# Patient Record
Sex: MALE | Race: BLACK OR AFRICAN AMERICAN | NOT HISPANIC OR LATINO | URBAN - METROPOLITAN AREA
[De-identification: names, ages, dates, MRNs, and addresses within clinical notes are randomized per-mention and may not be internally consistent; named-entity substitution may affect disease eponyms.]

---

## 2019-08-13 ENCOUNTER — EMERGENCY (EMERGENCY)
Facility: HOSPITAL | Age: 69
LOS: 0 days | Discharge: HOME | End: 2019-08-14
Attending: EMERGENCY MEDICINE
Payer: COMMERCIAL

## 2019-08-13 VITALS
HEIGHT: 70 IN | TEMPERATURE: 98 F | OXYGEN SATURATION: 95 % | WEIGHT: 169.98 LBS | HEART RATE: 69 BPM | SYSTOLIC BLOOD PRESSURE: 151 MMHG | DIASTOLIC BLOOD PRESSURE: 96 MMHG | RESPIRATION RATE: 20 BRPM

## 2019-08-13 DIAGNOSIS — N39.0 URINARY TRACT INFECTION, SITE NOT SPECIFIED: ICD-10-CM

## 2019-08-13 DIAGNOSIS — R30.0 DYSURIA: ICD-10-CM

## 2019-08-13 DIAGNOSIS — R31.9 HEMATURIA, UNSPECIFIED: ICD-10-CM

## 2019-08-13 DIAGNOSIS — Z87.891 PERSONAL HISTORY OF NICOTINE DEPENDENCE: ICD-10-CM

## 2019-08-13 LAB
ALBUMIN SERPL ELPH-MCNC: 3.7 G/DL — SIGNIFICANT CHANGE UP (ref 3.3–5)
ALP SERPL-CCNC: 81 U/L — SIGNIFICANT CHANGE UP (ref 40–120)
ALT FLD-CCNC: 41 U/L — SIGNIFICANT CHANGE UP (ref 12–78)
ANION GAP SERPL CALC-SCNC: 5 MMOL/L — SIGNIFICANT CHANGE UP (ref 5–17)
APPEARANCE UR: CLEAR — SIGNIFICANT CHANGE UP
APTT BLD: 31.3 SEC — SIGNIFICANT CHANGE UP (ref 27.5–36.3)
AST SERPL-CCNC: 30 U/L — SIGNIFICANT CHANGE UP (ref 15–37)
BACTERIA # UR AUTO: ABNORMAL
BASOPHILS # BLD AUTO: 0.02 K/UL — SIGNIFICANT CHANGE UP (ref 0–0.2)
BASOPHILS NFR BLD AUTO: 0.3 % — SIGNIFICANT CHANGE UP (ref 0–2)
BILIRUB SERPL-MCNC: 0.5 MG/DL — SIGNIFICANT CHANGE UP (ref 0.2–1.2)
BILIRUB UR-MCNC: NEGATIVE — SIGNIFICANT CHANGE UP
BUN SERPL-MCNC: 12 MG/DL — SIGNIFICANT CHANGE UP (ref 7–23)
CALCIUM SERPL-MCNC: 8.5 MG/DL — SIGNIFICANT CHANGE UP (ref 8.5–10.1)
CHLORIDE SERPL-SCNC: 107 MMOL/L — SIGNIFICANT CHANGE UP (ref 96–108)
CO2 SERPL-SCNC: 25 MMOL/L — SIGNIFICANT CHANGE UP (ref 22–31)
COLOR SPEC: YELLOW — SIGNIFICANT CHANGE UP
CREAT SERPL-MCNC: 0.95 MG/DL — SIGNIFICANT CHANGE UP (ref 0.5–1.3)
DIFF PNL FLD: ABNORMAL
EOSINOPHIL # BLD AUTO: 0.17 K/UL — SIGNIFICANT CHANGE UP (ref 0–0.5)
EOSINOPHIL NFR BLD AUTO: 2.9 % — SIGNIFICANT CHANGE UP (ref 0–6)
GLUCOSE SERPL-MCNC: 123 MG/DL — HIGH (ref 70–99)
GLUCOSE UR QL: NEGATIVE MG/DL — SIGNIFICANT CHANGE UP
HCT VFR BLD CALC: 44.4 % — SIGNIFICANT CHANGE UP (ref 39–50)
HGB BLD-MCNC: 14.7 G/DL — SIGNIFICANT CHANGE UP (ref 13–17)
IMM GRANULOCYTES NFR BLD AUTO: 0.2 % — SIGNIFICANT CHANGE UP (ref 0–1.5)
INR BLD: 1.07 RATIO — SIGNIFICANT CHANGE UP (ref 0.88–1.16)
KETONES UR-MCNC: NEGATIVE — SIGNIFICANT CHANGE UP
LEUKOCYTE ESTERASE UR-ACNC: ABNORMAL
LYMPHOCYTES # BLD AUTO: 2.25 K/UL — SIGNIFICANT CHANGE UP (ref 1–3.3)
LYMPHOCYTES # BLD AUTO: 38 % — SIGNIFICANT CHANGE UP (ref 13–44)
MCHC RBC-ENTMCNC: 27.5 PG — SIGNIFICANT CHANGE UP (ref 27–34)
MCHC RBC-ENTMCNC: 33.1 GM/DL — SIGNIFICANT CHANGE UP (ref 32–36)
MCV RBC AUTO: 83 FL — SIGNIFICANT CHANGE UP (ref 80–100)
MONOCYTES # BLD AUTO: 0.42 K/UL — SIGNIFICANT CHANGE UP (ref 0–0.9)
MONOCYTES NFR BLD AUTO: 7.1 % — SIGNIFICANT CHANGE UP (ref 2–14)
NEUTROPHILS # BLD AUTO: 3.05 K/UL — SIGNIFICANT CHANGE UP (ref 1.8–7.4)
NEUTROPHILS NFR BLD AUTO: 51.5 % — SIGNIFICANT CHANGE UP (ref 43–77)
NITRITE UR-MCNC: NEGATIVE — SIGNIFICANT CHANGE UP
NRBC # BLD: 0 /100 WBCS — SIGNIFICANT CHANGE UP (ref 0–0)
PH UR: 6 — SIGNIFICANT CHANGE UP (ref 5–8)
PLATELET # BLD AUTO: 185 K/UL — SIGNIFICANT CHANGE UP (ref 150–400)
POTASSIUM SERPL-MCNC: 4.6 MMOL/L — SIGNIFICANT CHANGE UP (ref 3.5–5.3)
POTASSIUM SERPL-SCNC: 4.6 MMOL/L — SIGNIFICANT CHANGE UP (ref 3.5–5.3)
PROT SERPL-MCNC: 7.8 GM/DL — SIGNIFICANT CHANGE UP (ref 6–8.3)
PROT UR-MCNC: 100 MG/DL
PROTHROM AB SERPL-ACNC: 12 SEC — SIGNIFICANT CHANGE UP (ref 10–12.9)
RBC # BLD: 5.35 M/UL — SIGNIFICANT CHANGE UP (ref 4.2–5.8)
RBC # FLD: 12.4 % — SIGNIFICANT CHANGE UP (ref 10.3–14.5)
RBC CASTS # UR COMP ASSIST: >50 /HPF (ref 0–4)
SODIUM SERPL-SCNC: 137 MMOL/L — SIGNIFICANT CHANGE UP (ref 135–145)
SP GR SPEC: 1.02 — SIGNIFICANT CHANGE UP (ref 1.01–1.02)
UROBILINOGEN FLD QL: 1 MG/DL
WBC # BLD: 5.92 K/UL — SIGNIFICANT CHANGE UP (ref 3.8–10.5)
WBC # FLD AUTO: 5.92 K/UL — SIGNIFICANT CHANGE UP (ref 3.8–10.5)
WBC UR QL: ABNORMAL

## 2019-08-13 PROCEDURE — 99284 EMERGENCY DEPT VISIT MOD MDM: CPT

## 2019-08-13 NOTE — ED PROVIDER NOTE - OBJECTIVE STATEMENT
Pertinent PMH/PSH/FHx/SHx and Review of Systems contained within:    67yo M w no significant PMH/PSH presents to ED for eval of hematuria w dysuria.  Pt states sx started yesterday, +passage of small clots.  Denies fever, chills, trauma, abd pain, flank pain, urinary retention, bleeding from other sites, vomiting, previous episodes, use of ASA or other blood thinners.  Pt is former smoker, reports family hx DM.    No fever/chills, No photophobia/eye pain/changes in vision, No ear pain/sore throat/dysphagia, No chest pain/palpitations, no SOB/cough/wheeze/stridor, No abdominal pain, +dysuria/frequency, No neck/back pain, no rash, no changes in neurological status/function.

## 2019-08-13 NOTE — ED ADULT NURSE NOTE - OBJECTIVE STATEMENT
Pt presents w/ complaints of hematuria onset yesterday, also complains of dysuria, urinary frequency. Denied any flank pain, fever, n/v dizziness

## 2019-08-13 NOTE — ED PROVIDER NOTE - PHYSICAL EXAMINATION
Gen: Alert, NAD  Head: NC, AT, EOMI, normal lids/conjunctiva  ENT: normal hearing, patent oropharynx, MMM  Neck: supple, no tenderness/meningismus, FROM, Trachea midline  Pulm: Bilateral clear BS, normal resp effort, no wheeze/stridor/retractions  CV: RRR, no M/R/G, +dist pulses  Abd: soft, NT/ND, +BS, no guarding/rebound tenderness, no CVAT  Mskel: no edema/erythema/cyanosis  Skin: no rash  Neuro: no sensory/motor deficits

## 2019-08-13 NOTE — ED PROVIDER NOTE - CLINICAL SUMMARY MEDICAL DECISION MAKING FREE TEXT BOX
Pt w hematuria, UA +UTI.  CT w clot vs bladder wall thickening, also noted enlarged prostate.  Pt given urology follow up and states he will follow up.  Also discussed findings of emphysema & elevated glucose.  Discussed results and outcome of testing with the patient, given copy as well.  Pt given Rx and instructed/cautioned on use. Patient advised to please follow up with their primary care doctor within the next 24 hours and return to the Emergency Department for worsening symptoms or any other concerns.  Patient advised that their doctor may call  to follow up on the specific results of the tests performed today in the emergency department.

## 2019-08-13 NOTE — ED ADULT NURSE NOTE - NSIMPLEMENTINTERV_GEN_ALL_ED
Implemented All Universal Safety Interventions:  [place] to call system. Call bell, personal items and telephone within reach. Instruct patient to call for assistance. Room bathroom lighting operational. Non-slip footwear when patient is off stretcher. Physically safe environment: no spills, clutter or unnecessary equipment. Stretcher in lowest position, wheels locked, appropriate side rails in place.

## 2019-08-14 VITALS
RESPIRATION RATE: 14 BRPM | HEART RATE: 66 BPM | DIASTOLIC BLOOD PRESSURE: 77 MMHG | OXYGEN SATURATION: 97 % | TEMPERATURE: 98 F | SYSTOLIC BLOOD PRESSURE: 179 MMHG

## 2019-08-14 PROCEDURE — 74177 CT ABD & PELVIS W/CONTRAST: CPT | Mod: 26

## 2019-08-14 RX ORDER — CEFPODOXIME PROXETIL 100 MG
1 TABLET ORAL
Qty: 20 | Refills: 0
Start: 2019-08-14 | End: 2019-08-23

## 2019-08-14 RX ORDER — CEFTRIAXONE 500 MG/1
1000 INJECTION, POWDER, FOR SOLUTION INTRAMUSCULAR; INTRAVENOUS ONCE
Refills: 0 | Status: COMPLETED | OUTPATIENT
Start: 2019-08-14 | End: 2019-08-14

## 2019-08-14 RX ADMIN — CEFTRIAXONE 100 MILLIGRAM(S): 500 INJECTION, POWDER, FOR SOLUTION INTRAMUSCULAR; INTRAVENOUS at 00:46

## 2019-08-15 PROBLEM — Z00.00 ENCOUNTER FOR PREVENTIVE HEALTH EXAMINATION: Status: ACTIVE | Noted: 2019-08-15

## 2019-08-15 LAB
CULTURE RESULTS: SIGNIFICANT CHANGE UP
SPECIMEN SOURCE: SIGNIFICANT CHANGE UP

## 2019-08-16 ENCOUNTER — TRANSCRIPTION ENCOUNTER (OUTPATIENT)
Age: 69
End: 2019-08-16

## 2019-08-16 ENCOUNTER — APPOINTMENT (OUTPATIENT)
Dept: UROLOGY | Facility: CLINIC | Age: 69
End: 2019-08-16
Payer: COMMERCIAL

## 2019-08-16 VITALS
OXYGEN SATURATION: 98 % | DIASTOLIC BLOOD PRESSURE: 81 MMHG | HEIGHT: 70.5 IN | SYSTOLIC BLOOD PRESSURE: 131 MMHG | HEART RATE: 71 BPM

## 2019-08-16 DIAGNOSIS — Z80.42 FAMILY HISTORY OF MALIGNANT NEOPLASM OF PROSTATE: ICD-10-CM

## 2019-08-16 DIAGNOSIS — Z83.3 FAMILY HISTORY OF DIABETES MELLITUS: ICD-10-CM

## 2019-08-16 DIAGNOSIS — Z87.891 PERSONAL HISTORY OF NICOTINE DEPENDENCE: ICD-10-CM

## 2019-08-16 DIAGNOSIS — Z78.9 OTHER SPECIFIED HEALTH STATUS: ICD-10-CM

## 2019-08-16 PROCEDURE — 51798 US URINE CAPACITY MEASURE: CPT

## 2019-08-16 PROCEDURE — 99204 OFFICE O/P NEW MOD 45 MIN: CPT | Mod: 25

## 2019-08-16 NOTE — LETTER BODY
[Dear  ___] : Dear  [unfilled], [Consult Letter:] : I had the pleasure of evaluating your patient, [unfilled]. [Please see my note below.] : Please see my note below. [Consult Closing:] : Thank you very much for allowing me to participate in the care of this patient.  If you have any questions, please do not hesitate to contact me. [FreeTextEntry3] : Sincerely,\par \par Bita Barrera MD\par [facility]\par

## 2019-08-16 NOTE — REVIEW OF SYSTEMS
[Dry Eyes] : dryness of the eyes [Eyesight Problems] : eyesight problems [Poor quality erections] : Poor quality erections [Urine Infection (bladder/kidney)] : bladder/kidney infection [Pain during urination] : pain during urination [Blood in urine that you can see] : blood visible in urine [Told you have blood in urine on a urine test] : told blood was present in a urine test [Wake up at night to urinate  How many times?  ___] : wakes up to urinate [unfilled] times during the night [Strong urge to urinate] : strong urge to urinate [Strain or push to urinate] : strain or push to urinate [Bladder pressure] : experiences bladder pressure [Negative] : Heme/Lymph

## 2019-08-16 NOTE — HISTORY OF PRESENT ILLNESS
[FreeTextEntry1] : SHANE SCHMITT is a 68 year old M who presents with A 3 day history of gross hematuria and dysuria. He is complaining of increased urinary urgency and frequency and feels that he must be close to a restroom. He typically does not have urinary urgency and does in fact report passage of large blood clots with this episode. Apparently, he had similar symptoms over 15 years ago. He does not recall if he had an infection or a stone, but does not recall having known stones. He is no longer a smoker having quit 6 years ago, but did smoke a pack to a pack and a half a day for approximately 35 years. It is not believe he has had a baseline chest CT for screening purposes. With his dysuria there was no cloudiness to the urine prior or leading up to this episode and he denies any urethral discharge the urine is malodorous.\par \par In reference to review of systems, he has no constitutional symptoms, specifically denying weight loss, bone, flank, abdominal, or pelvic pain, or change in appetite.  At baseline, he voids 4 times per day with no hesitancy of initiation or urinary urgency. He feels his stream is pretty strong and is steady.  He denies double voiding or straining to empty. Since this episode, he is voiding 10-15 times per day and his nocturia has increased from one episode up to between 3 and 6 episodes of nocturia.\par \par Despite bleeding and some pyuria, urine culture was negative on 8/15/19. CT with IV contrast showed a very large central prostatic calcification and some prostatic enlargement with no protrusion of the middle lobe. He did have some small left renal calculi with no hydronephrosis, which were difficult to see due to the IV contrast. There was a soft tissue mass in the bladder which could have been due to clots.  He and I reviewed the CT scan together.\par

## 2019-08-16 NOTE — ASSESSMENT
[FreeTextEntry1] : Today our patient emptiesd his bladder completely, and his exam revealed no evidence of prostatitis or nodule. He believes he had a PSA checked with PCP, and I requested that he have them forward it to our attention. This is not the time to do it.  We will send urine for culture and cytology and he understands need for cystocopy.  Typically, a CT urogram is done (Without, with and with delayed imaging) to assess collecting system, but we will await our cystocopy and cytology, as he is going out of town for a few days next week anyway per patient. \par \par There is longevity in  with M passing at 83 and D at 85.  We will need to establish his baseline at some point in future.\par \par

## 2019-08-16 NOTE — PHYSICAL EXAM
[General Appearance - Well Developed] : well developed [General Appearance - Well Nourished] : well nourished [Normal Appearance] : normal appearance [Well Groomed] : well groomed [General Appearance - In No Acute Distress] : no acute distress [Edema] : no peripheral edema [Respiration, Rhythm And Depth] : normal respiratory rhythm and effort [Exaggerated Use Of Accessory Muscles For Inspiration] : no accessory muscle use [Abdomen Soft] : soft [Abdomen Tenderness] : non-tender [Abdomen Hernia] : no hernia was discovered [Urethral Meatus] : meatus normal [Costovertebral Angle Tenderness] : no ~M costovertebral angle tenderness [Penis Abnormality] : normal uncircumcised penis [Urinary Bladder Findings] : the bladder was normal on palpation [Scrotum] : the scrotum was normal [Epididymis] : the epididymides were normal [Testes Tenderness] : no tenderness of the testes [Testes Mass (___cm)] : there were no testicular masses [Prostate Tenderness] : the prostate was not tender [No Prostate Nodules] : no prostate nodules [Normal Station and Gait] : the gait and station were normal for the patient's age [] : no rash [No Focal Deficits] : no focal deficits [Oriented To Time, Place, And Person] : oriented to person, place, and time [Affect] : the affect was normal [Mood] : the mood was normal [Not Anxious] : not anxious [Femoral Lymph Nodes Enlarged Bilaterally] : femoral [Inguinal Lymph Nodes Enlarged Bilaterally] : inguinal

## 2019-08-19 LAB — BACTERIA UR CULT: NORMAL

## 2019-08-20 ENCOUNTER — CLINICAL ADVICE (OUTPATIENT)
Age: 69
End: 2019-08-20

## 2019-08-20 ENCOUNTER — TRANSCRIPTION ENCOUNTER (OUTPATIENT)
Age: 69
End: 2019-08-20

## 2019-08-20 ENCOUNTER — INPATIENT (INPATIENT)
Facility: HOSPITAL | Age: 69
LOS: 1 days | Discharge: HOME | End: 2019-08-22
Attending: INTERNAL MEDICINE | Admitting: INTERNAL MEDICINE
Payer: COMMERCIAL

## 2019-08-20 VITALS
RESPIRATION RATE: 15 BRPM | DIASTOLIC BLOOD PRESSURE: 93 MMHG | HEIGHT: 70 IN | WEIGHT: 179.02 LBS | SYSTOLIC BLOOD PRESSURE: 142 MMHG | OXYGEN SATURATION: 98 % | HEART RATE: 68 BPM | TEMPERATURE: 98 F

## 2019-08-20 DIAGNOSIS — N21.1 CALCULUS IN URETHRA: ICD-10-CM

## 2019-08-20 DIAGNOSIS — Z98.890 OTHER SPECIFIED POSTPROCEDURAL STATES: Chronic | ICD-10-CM

## 2019-08-20 DIAGNOSIS — R31.0 GROSS HEMATURIA: ICD-10-CM

## 2019-08-20 DIAGNOSIS — Z29.9 ENCOUNTER FOR PROPHYLACTIC MEASURES, UNSPECIFIED: ICD-10-CM

## 2019-08-20 LAB
ALBUMIN SERPL ELPH-MCNC: 3.6 G/DL — SIGNIFICANT CHANGE UP (ref 3.3–5)
ALP SERPL-CCNC: 86 U/L — SIGNIFICANT CHANGE UP (ref 40–120)
ALT FLD-CCNC: 23 U/L — SIGNIFICANT CHANGE UP (ref 12–78)
ANION GAP SERPL CALC-SCNC: 7 MMOL/L — SIGNIFICANT CHANGE UP (ref 5–17)
APPEARANCE UR: ABNORMAL
APTT BLD: 27.2 SEC — LOW (ref 28.5–37)
AST SERPL-CCNC: 8 U/L — LOW (ref 15–37)
BACTERIA # UR AUTO: ABNORMAL
BASOPHILS # BLD AUTO: 0.01 K/UL — SIGNIFICANT CHANGE UP (ref 0–0.2)
BASOPHILS NFR BLD AUTO: 0.2 % — SIGNIFICANT CHANGE UP (ref 0–2)
BILIRUB SERPL-MCNC: 0.3 MG/DL — SIGNIFICANT CHANGE UP (ref 0.2–1.2)
BILIRUB UR-MCNC: NEGATIVE — SIGNIFICANT CHANGE UP
BUN SERPL-MCNC: 13 MG/DL — SIGNIFICANT CHANGE UP (ref 7–23)
CALCIUM SERPL-MCNC: 8.3 MG/DL — LOW (ref 8.5–10.1)
CHLORIDE SERPL-SCNC: 110 MMOL/L — HIGH (ref 96–108)
CO2 SERPL-SCNC: 27 MMOL/L — SIGNIFICANT CHANGE UP (ref 22–31)
COLOR SPEC: ABNORMAL
CREAT SERPL-MCNC: 1.61 MG/DL — HIGH (ref 0.5–1.3)
DIFF PNL FLD: ABNORMAL
EOSINOPHIL # BLD AUTO: 0.18 K/UL — SIGNIFICANT CHANGE UP (ref 0–0.5)
EOSINOPHIL NFR BLD AUTO: 3.6 % — SIGNIFICANT CHANGE UP (ref 0–6)
EPI CELLS # UR: SIGNIFICANT CHANGE UP
GLUCOSE SERPL-MCNC: 151 MG/DL — HIGH (ref 70–99)
GLUCOSE UR QL: 100 MG/DL
HCT VFR BLD CALC: 45.1 % — SIGNIFICANT CHANGE UP (ref 39–50)
HGB BLD-MCNC: 14.7 G/DL — SIGNIFICANT CHANGE UP (ref 13–17)
IMM GRANULOCYTES NFR BLD AUTO: 0.2 % — SIGNIFICANT CHANGE UP (ref 0–1.5)
INR BLD: 1.18 RATIO — HIGH (ref 0.88–1.16)
KETONES UR-MCNC: ABNORMAL
LEUKOCYTE ESTERASE UR-ACNC: ABNORMAL
LYMPHOCYTES # BLD AUTO: 1.49 K/UL — SIGNIFICANT CHANGE UP (ref 1–3.3)
LYMPHOCYTES # BLD AUTO: 29.6 % — SIGNIFICANT CHANGE UP (ref 13–44)
MCHC RBC-ENTMCNC: 27.4 PG — SIGNIFICANT CHANGE UP (ref 27–34)
MCHC RBC-ENTMCNC: 32.6 GM/DL — SIGNIFICANT CHANGE UP (ref 32–36)
MCV RBC AUTO: 84 FL — SIGNIFICANT CHANGE UP (ref 80–100)
MONOCYTES # BLD AUTO: 0.36 K/UL — SIGNIFICANT CHANGE UP (ref 0–0.9)
MONOCYTES NFR BLD AUTO: 7.2 % — SIGNIFICANT CHANGE UP (ref 2–14)
NEUTROPHILS # BLD AUTO: 2.98 K/UL — SIGNIFICANT CHANGE UP (ref 1.8–7.4)
NEUTROPHILS NFR BLD AUTO: 59.2 % — SIGNIFICANT CHANGE UP (ref 43–77)
NITRITE UR-MCNC: NEGATIVE — SIGNIFICANT CHANGE UP
NRBC # BLD: 0 /100 WBCS — SIGNIFICANT CHANGE UP (ref 0–0)
PH UR: 6.5 — SIGNIFICANT CHANGE UP (ref 5–8)
PLATELET # BLD AUTO: 166 K/UL — SIGNIFICANT CHANGE UP (ref 150–400)
POTASSIUM SERPL-MCNC: 4 MMOL/L — SIGNIFICANT CHANGE UP (ref 3.5–5.3)
POTASSIUM SERPL-SCNC: 4 MMOL/L — SIGNIFICANT CHANGE UP (ref 3.5–5.3)
PROT SERPL-MCNC: 7.5 GM/DL — SIGNIFICANT CHANGE UP (ref 6–8.3)
PROT UR-MCNC: 100 MG/DL
PROTHROM AB SERPL-ACNC: 13.3 SEC — HIGH (ref 10–12.9)
RBC # BLD: 5.37 M/UL — SIGNIFICANT CHANGE UP (ref 4.2–5.8)
RBC # FLD: 12.6 % — SIGNIFICANT CHANGE UP (ref 10.3–14.5)
RBC CASTS # UR COMP ASSIST: >50 /HPF (ref 0–4)
SODIUM SERPL-SCNC: 144 MMOL/L — SIGNIFICANT CHANGE UP (ref 135–145)
SP GR SPEC: 1.01 — SIGNIFICANT CHANGE UP (ref 1.01–1.02)
UROBILINOGEN FLD QL: NEGATIVE MG/DL — SIGNIFICANT CHANGE UP
WBC # BLD: 5.03 K/UL — SIGNIFICANT CHANGE UP (ref 3.8–10.5)
WBC # FLD AUTO: 5.03 K/UL — SIGNIFICANT CHANGE UP (ref 3.8–10.5)
WBC UR QL: SIGNIFICANT CHANGE UP

## 2019-08-20 PROCEDURE — 99254 IP/OBS CNSLTJ NEW/EST MOD 60: CPT | Mod: 25

## 2019-08-20 PROCEDURE — 71045 X-RAY EXAM CHEST 1 VIEW: CPT | Mod: 26

## 2019-08-20 PROCEDURE — 93010 ELECTROCARDIOGRAM REPORT: CPT

## 2019-08-20 PROCEDURE — 99285 EMERGENCY DEPT VISIT HI MDM: CPT

## 2019-08-20 PROCEDURE — 99223 1ST HOSP IP/OBS HIGH 75: CPT

## 2019-08-20 PROCEDURE — 51702 INSERT TEMP BLADDER CATH: CPT

## 2019-08-20 PROCEDURE — 74176 CT ABD & PELVIS W/O CONTRAST: CPT | Mod: 26

## 2019-08-20 RX ORDER — SODIUM CHLORIDE 9 MG/ML
1000 INJECTION, SOLUTION INTRAVENOUS
Refills: 0 | Status: DISCONTINUED | OUTPATIENT
Start: 2019-08-20 | End: 2019-08-21

## 2019-08-20 RX ORDER — SODIUM CHLORIDE 9 MG/ML
1000 INJECTION INTRAMUSCULAR; INTRAVENOUS; SUBCUTANEOUS ONCE
Refills: 0 | Status: COMPLETED | OUTPATIENT
Start: 2019-08-20 | End: 2019-08-20

## 2019-08-20 RX ORDER — CEFTRIAXONE 500 MG/1
1000 INJECTION, POWDER, FOR SOLUTION INTRAMUSCULAR; INTRAVENOUS EVERY 24 HOURS
Refills: 0 | Status: DISCONTINUED | OUTPATIENT
Start: 2019-08-20 | End: 2019-08-21

## 2019-08-20 RX ORDER — LIDOCAINE HCL 20 MG/ML
10 VIAL (ML) INJECTION ONCE
Refills: 0 | Status: COMPLETED | OUTPATIENT
Start: 2019-08-20 | End: 2019-08-20

## 2019-08-20 RX ORDER — MORPHINE SULFATE 50 MG/1
2 CAPSULE, EXTENDED RELEASE ORAL ONCE
Refills: 0 | Status: DISCONTINUED | OUTPATIENT
Start: 2019-08-20 | End: 2019-08-20

## 2019-08-20 RX ADMIN — CEFTRIAXONE 100 MILLIGRAM(S): 500 INJECTION, POWDER, FOR SOLUTION INTRAMUSCULAR; INTRAVENOUS at 21:11

## 2019-08-20 RX ADMIN — SODIUM CHLORIDE 75 MILLILITER(S): 9 INJECTION, SOLUTION INTRAVENOUS at 23:49

## 2019-08-20 RX ADMIN — SODIUM CHLORIDE 1000 MILLILITER(S): 9 INJECTION INTRAMUSCULAR; INTRAVENOUS; SUBCUTANEOUS at 16:10

## 2019-08-20 RX ADMIN — MORPHINE SULFATE 2 MILLIGRAM(S): 50 CAPSULE, EXTENDED RELEASE ORAL at 18:41

## 2019-08-20 RX ADMIN — MORPHINE SULFATE 2 MILLIGRAM(S): 50 CAPSULE, EXTENDED RELEASE ORAL at 18:22

## 2019-08-20 RX ADMIN — Medication 10 MILLILITER(S): at 19:06

## 2019-08-20 RX ADMIN — SODIUM CHLORIDE 1000 MILLILITER(S): 9 INJECTION INTRAMUSCULAR; INTRAVENOUS; SUBCUTANEOUS at 18:41

## 2019-08-20 RX ADMIN — SODIUM CHLORIDE 1000 MILLILITER(S): 9 INJECTION INTRAMUSCULAR; INTRAVENOUS; SUBCUTANEOUS at 18:22

## 2019-08-20 RX ADMIN — SODIUM CHLORIDE 75 MILLILITER(S): 9 INJECTION, SOLUTION INTRAVENOUS at 21:11

## 2019-08-20 NOTE — H&P ADULT - NSHPPHYSICALEXAM_GEN_ALL_CORE
Vital Signs Last 24 Hrs  T(C): 36.9 (20 Aug 2019 19:10), Max: 36.9 (20 Aug 2019 19:10)  T(F): 98.4 (20 Aug 2019 19:10), Max: 98.4 (20 Aug 2019 19:10)  HR: 63 (20 Aug 2019 19:10) (63 - 68)  BP: 143/83 (20 Aug 2019 19:10) (142/93 - 143/83)  BP(mean): --  RR: 16 (20 Aug 2019 19:10) (15 - 16)  SpO2: 97% (20 Aug 2019 19:10) (97% - 98%)    PHYSICAL EXAM:    GENERAL: NAD, well-groomed, well-developed  HEAD:  Atraumatic, Normocephalic  EYES: EOMI, PERRLA, conjunctiva and sclera clear  ENMT: No tonsillar erythema, exudates, or enlargement; Moist mucous membranes, No lesions  NECK: Supple, No JVD, Normal thyroid  NERVOUS SYSTEM:  Alert & Oriented X3, Good concentration; Motor Strength 5/5 B/L upper and lower extremities; DTRs 2+ intact and symmetric  CHEST/LUNG: Clear to percussion bilaterally; No rales, rhonchi, wheezing, or rubs  HEART: Regular rate and rhythm; No rubs, or gallops, +S1,S2  ABDOMEN: Soft, Nontender, Nondistended; Bowel sounds present  EXTREMITIES:  2+ Peripheral Pulses, No clubbing, cyanosis, or edema  LYMPH: No cervical adenopathy  RECTAL: deferred  BREAST: No palpatble masses, skin no lesions   : nl male genitalia  SKIN: No rashes or lesions    IMPROVE VTE Individual Risk Assessment          RISK                                                          Points  [  ] Previous VTE                                                3  [  ] Thrombophilia                                             2  [  ] Lower limb paralysis                                    2        (unable to hold up >15 seconds)    [  ] Current Cancer                                             2         (within 6 months)  [  ] Immobilization > 24 hrs                              1  [  ] ICU/CCU stay > 24 hours                            1  [ x ] Age > 60                                                    1  IMPROVE VTE Score ___1_____

## 2019-08-20 NOTE — H&P ADULT - NSHPLABSRESULTS_GEN_ALL_CORE
LABS:                        14.7   5.03  )-----------( 166      ( 20 Aug 2019 14:32 )             45.1     08-    144  |  110<H>  |  13  ----------------------------<  151<H>  4.0   |  27  |  1.61<H>    Ca    8.3<L>      20 Aug 2019 14:32    TPro  7.5  /  Alb  3.6  /  TBili  0.3  /  DBili  x   /  AST  8<L>  /  ALT  23  /  AlkPhos  86  08-20    PT/INR - ( 20 Aug 2019 14:32 )   PT: 13.3 sec;   INR: 1.18 ratio         PTT - ( 20 Aug 2019 14:32 )  PTT:27.2 sec  Urinalysis Basic - ( 20 Aug 2019 14:32 )    Color: Red / Appearance: very cloudy / S.015 / pH: x  Gluc: x / Ketone: Trace  / Bili: Negative / Urobili: Negative mg/dL   Blood: x / Protein: 100 mg/dL / Nitrite: Negative   Leuk Esterase: Small / RBC: >50 /HPF / WBC 3-5   Sq Epi: x / Non Sq Epi: Occasional / Bacteria: Few      CAPILLARY BLOOD GLUCOSE          RADIOLOGY & ADDITIONAL TESTS:    Imaging Personally Reviewed:  [ X] YES  [ ] NO

## 2019-08-20 NOTE — ED ADULT NURSE REASSESSMENT NOTE - NS ED NURSE REASSESS COMMENT FT1
Pt voided 150ml of bloody urine. States it wasn't as severe as before. States before it was dripping.

## 2019-08-20 NOTE — ED PROVIDER NOTE - CHPI ED SYMPTOMS NEG
no blood in stool/no diarrhea/no dysuria/no nausea/no abdominal distension/no vomiting/no chills/no fever

## 2019-08-20 NOTE — ED ADULT NURSE NOTE - OBJECTIVE STATEMENT
pt c/o hematuria x 5 days becoming progressively worse. gross hematuria noted. pt also c/o lower back pain started today in ED and retention. seen in ED x 5 days ago

## 2019-08-20 NOTE — CONSULT NOTE ADULT - ASSESSMENT
A/P: 68M with complaint of hematuria and dysuria, UR, IRENE likely 2/2 urethral stone and possible clot retention.     -Per discussion with Dr. Barrera,   -patient will need atleast a 22F 3 way davis catheter placed for irrigation  -Will f/u PVR after davis placed  -Patient will need cystoscopy for further evaluation and removal of urethral stone A/P: 68M with complaint of hematuria and dysuria, UR, IRENE likely 2/2 urethral stone and possible clot retention.   S/p 24F 3 way davis catheter insertion as per Dr. Barrera, 200 cc urine output with minimal clot irrigated. Davis removed by Dr. Barrera, and 2% lidocaine urojet intraurethral jelly inserted into urethra to milk out distal urethral stone however unsuccessful due to size of stone and patient discomfort.     -Admit to medicine   -As per Dr. Barrera, will f/u PVR and continue to monitor patient for UR --> if in UR will replace davis catheter  -Rocephin  -IV hydration  -OR planning Thursday for cystoscopy and removal of urethral stone  -f/u Labs, trend renal function

## 2019-08-20 NOTE — CONSULT NOTE ADULT - SUBJECTIVE AND OBJECTIVE BOX
UROLOGY CONSULT NOTE    Patient is a 68y old  Male who presents with a chief complaint of Gross hematuria, and dysuria     HPI: 68M presents to the ER c/o hematuria and dysuria x over a week, seen by Dr. Barrera in the office who scheduled patient for cystoscopy later this month however patient states he came to the ER today due to worsening hematuria and dysuria. Patient states he is voiding small amounts, and feels like he cannot empty his bladder. States it feels like a "rock" moved to the tip of his penis today causing a lot of pain. Admit to dribbling, using a pad in his briefs.   Denies fever/chills, chest pain, sob, abdominal pain, n/v, or change in BM.      PAST MEDICAL & SURGICAL HISTORY:  No pertinent past medical history      Review of Systems:  As noted in HPI    MEDICATIONS  (STANDING):    Allergies    No Known Allergies    SOCIAL HISTORY   Denies etoh, tobacco, or illicit drug use.     FAMILY HISTORY:  Denies     Vital Signs Last 24 Hrs  T(C): 36.8 (20 Aug 2019 13:28), Max: 36.8 (20 Aug 2019 13:28)  T(F): 98.2 (20 Aug 2019 13:28), Max: 98.2 (20 Aug 2019 13:28)  HR: 68 (20 Aug 2019 13:28) (68 - 68)  BP: 142/93 (20 Aug 2019 13:28) (142/93 - 142/93)  RR: 15 (20 Aug 2019 13:28) (15 - 15)  SpO2: 98% (20 Aug 2019 13:28) (98% - 98%)    Physical Exam:    General:  Appears stated age, well-groomed, well-nourished, no distress  Eyes : JANAK  HENT:  WNL, no JVD  Chest:  clear breath sounds  Cardiovascular:  Regular rate & rhythm  Abdomen:  Soft, NTND  : Adequate meatus, bleeding noted from meatus, circumcised. TTP over bladder.    Extremities: 2+ peripheral pulses, calf soft, nontender b/l  Skin:  No rash  Musculoskeletal:  normal strength  Neuro/Psych:  Alert, oriented to time, place and person       LABS:                        14.7   5.03  )-----------( 166      ( 20 Aug 2019 14:32 )             45.1     08-20    144  |  110<H>  |  13  ----------------------------<  151<H>  4.0   |  27  |  1.61<H>    Ca    8.3<L>      20 Aug 2019 14:32    TPro  7.5  /  Alb  3.6  /  TBili  0.3  /  DBili  x   /  AST  8<L>  /  ALT  23  /  AlkPhos  86  08-    PT/INR - ( 20 Aug 2019 14:32 )   PT: 13.3 sec;   INR: 1.18 ratio         PTT - ( 20 Aug 2019 14:32 )  PTT:27.2 sec  Urinalysis Basic - ( 20 Aug 2019 14:32 )    Color: Red / Appearance: very cloudy / S.015 / pH: x  Gluc: x / Ketone: Trace  / Bili: Negative / Urobili: Negative mg/dL   Blood: x / Protein: 100 mg/dL / Nitrite: Negative   Leuk Esterase: Small / RBC: >50 /HPF / WBC 3-5   Sq Epi: x / Non Sq Epi: Occasional / Bacteria: Few    RADIOLOGY & ADDITIONAL STUDIES:  < from: CT Renal Stone Hunt (19 @ 15:40) >  Impression: Mild bilateral bronchiectasis.    No evidence for a ureteral calculus. No hydronephrosis. Small   nonobstructive calculi in the left kidney.  Previously noted 1.2 cm   central calcification in the prostate has migrated to the distal penis,   likely representing a stone in the penile urethra.    1.0 cm isodense lesion in the right kidney. Abdominal MR without and with   IV contrast may be pursued for further evaluation on a nonemergent   outpatient basis.    < end of copied text > UROLOGY CONSULT NOTE    Patient is a 68y old  Male who presents with a chief complaint of Gross hematuria, and dysuria     HPI: 68M presents to the ER c/o hematuria and dysuria x over a week, seen by Dr. Barrera in the office who scheduled patient for cystoscopy later this month however patient states he came to the ER today due to worsening hematuria and dysuria. Patient states he is voiding small amounts, and feels like he cannot empty his bladder. States it feels like a "rock" moved to the tip of his penis today causing a lot of pain. Admit to dribbling, using a pad in his briefs.   Denies fever/chills, chest pain, sob, abdominal pain, n/v, or change in BM.      PAST MEDICAL & SURGICAL HISTORY:  No pertinent past medical history      Review of Systems:  As noted in HPI    MEDICATIONS  (STANDING):    Allergies    No Known Allergies    SOCIAL HISTORY   Denies etoh, tobacco, or illicit drug use.     FAMILY HISTORY:  Denies     Vital Signs Last 24 Hrs  T(C): 36.8 (20 Aug 2019 13:28), Max: 36.8 (20 Aug 2019 13:28)  T(F): 98.2 (20 Aug 2019 13:28), Max: 98.2 (20 Aug 2019 13:28)  HR: 68 (20 Aug 2019 13:28) (68 - 68)  BP: 142/93 (20 Aug 2019 13:28) (142/93 - 142/93)  RR: 15 (20 Aug 2019 13:28) (15 - 15)  SpO2: 98% (20 Aug 2019 13:28) (98% - 98%)    Physical Exam:    General:  Appears stated age, well-groomed, well-nourished, no distress  Eyes : JANAK  HENT:  WNL, no JVD  Chest:  clear breath sounds  Cardiovascular:  Regular rate & rhythm  Abdomen:  Soft, NTND  : Adequate meatus, bleeding noted from meatus, circumcised. Palpable stone at distal penile urethra. TTP over bladder.    Extremities: 2+ peripheral pulses, calf soft, nontender b/l  Skin:  No rash  Musculoskeletal:  normal strength  Neuro/Psych:  Alert, oriented to time, place and person       LABS:                        14.7   5.03  )-----------( 166      ( 20 Aug 2019 14:32 )             45.1     08-20    144  |  110<H>  |  13  ----------------------------<  151<H>  4.0   |  27  |  1.61<H>    Ca    8.3<L>      20 Aug 2019 14:32    TPro  7.5  /  Alb  3.6  /  TBili  0.3  /  DBili  x   /  AST  8<L>  /  ALT  23  /  AlkPhos  86  08-20    PT/INR - ( 20 Aug 2019 14:32 )   PT: 13.3 sec;   INR: 1.18 ratio         PTT - ( 20 Aug 2019 14:32 )  PTT:27.2 sec  Urinalysis Basic - ( 20 Aug 2019 14:32 )    Color: Red / Appearance: very cloudy / S.015 / pH: x  Gluc: x / Ketone: Trace  / Bili: Negative / Urobili: Negative mg/dL   Blood: x / Protein: 100 mg/dL / Nitrite: Negative   Leuk Esterase: Small / RBC: >50 /HPF / WBC 3-5   Sq Epi: x / Non Sq Epi: Occasional / Bacteria: Few    RADIOLOGY & ADDITIONAL STUDIES:  < from: CT Renal Stone Hunt (19 @ 15:40) >  Impression: Mild bilateral bronchiectasis.    No evidence for a ureteral calculus. No hydronephrosis. Small   nonobstructive calculi in the left kidney.  Previously noted 1.2 cm   central calcification in the prostate has migrated to the distal penis,   likely representing a stone in the penile urethra.    1.0 cm isodense lesion in the right kidney. Abdominal MR without and with   IV contrast may be pursued for further evaluation on a nonemergent   outpatient basis.    < end of copied text >

## 2019-08-20 NOTE — H&P ADULT - ATTENDING COMMENTS
urinary retention with overflow secondary to urethral stone. renal function worsening. Gross hematuria. Scheduled for cystoscopy, lithotripsy and insertion of Noyola catheter. Procedure, Alternatives, Benefits and Risks discussed, all questions answered. Patient understands and requests to proceed with the plan and procedure.

## 2019-08-20 NOTE — CONSULT NOTE ADULT - ATTENDING COMMENTS
Patient seen and examined in ED for Clot colic, IRENE and stone. I had met him a wk ago when it looked like a lg prostatic calcification. His urine at that time was yellow and PVR <30 ml. He came to me for GH and was due for cystoscopy for his report of GH and clot passage a wk prior to our meeting.  There was no colic or pain or LUTS.    Today he presented to see the RN in my office when I was in OR and she scanned him for 178 ml. He was passing blood clots, having significant LUT sx's and felt something had migrated in his penis.  Davis was placed in ed and he states he felt better as he had a large, tight stomach from inability to urinate. PA could palpate a stone in ventral urethra distally.    When I came in, I was able to instill 120 ml and get it all back w only pink tinge, but stone was just subcoronal on exam and between davis and urethra. hence, davis removed. Stone had thereby been brought almost to tip, to within 5 mm of meatus. With significant amounts of lidocaine jelly and lube, with sterile instrumetation, a gentle manipulation was performed with fine clamp, but far to large to be extracted.  He was not comfortable and procedure aborted.    Please note patient's cr also daly to 1.6; he was ordered for admit and repeat labs in am with PVR x3 and knew he would need davis replaced if no voids.    Plan:  OR for cysto, laser lithotripsy of stone and likely/possible meatotomy.  f/u labs: wbc, cr  monitor for temp  f/u a culture, even though neg in office.  ck pvr and call if over 250 ml.    Bita Barrera M.D.

## 2019-08-20 NOTE — H&P ADULT - HISTORY OF PRESENT ILLNESS
Pt is a 68M presents to the ER c/o hematuria and dysuria x 1 week.  States at first had mild urinary discomfort that he was just watching and trying to drink fluids saw Dr. Barrera in the office who scheduled patient for cystoscopy later this month and was started on abx.  Pt states today pain and hematuria worse.  and while urinating felt like a rock moving to mid penis along with extreme pain and wasnt able to pass urine anymore.  then felt the stone move again to shaft and able to start passing bloody urine again.  was seen by urology and attempted to remove stone unsucessfully.  pt being admitted for further management.  pt denies any fever, chills, sob,cp , palpitations, n/v/d/c no travels or sick contacts.

## 2019-08-21 ENCOUNTER — RESULT REVIEW (OUTPATIENT)
Age: 69
End: 2019-08-21

## 2019-08-21 ENCOUNTER — APPOINTMENT (OUTPATIENT)
Dept: UROLOGY | Facility: CLINIC | Age: 69
End: 2019-08-21

## 2019-08-21 LAB
ANION GAP SERPL CALC-SCNC: 5 MMOL/L — SIGNIFICANT CHANGE UP (ref 5–17)
APPEARANCE: CLEAR
BACTERIA: ABNORMAL
BILIRUBIN URINE: NEGATIVE
BLOOD URINE: ABNORMAL
BUN SERPL-MCNC: 18 MG/DL — SIGNIFICANT CHANGE UP (ref 7–23)
CALCIUM OXALATE CRYSTALS: ABNORMAL
CALCIUM SERPL-MCNC: 8.4 MG/DL — LOW (ref 8.5–10.1)
CHLORIDE SERPL-SCNC: 113 MMOL/L — HIGH (ref 96–108)
CO2 SERPL-SCNC: 25 MMOL/L — SIGNIFICANT CHANGE UP (ref 22–31)
COLOR: YELLOW
CREAT SERPL-MCNC: 2.24 MG/DL — HIGH (ref 0.5–1.3)
CULTURE RESULTS: NO GROWTH — SIGNIFICANT CHANGE UP
GLUCOSE QUALITATIVE U: NEGATIVE
GLUCOSE SERPL-MCNC: 114 MG/DL — HIGH (ref 70–99)
HCT VFR BLD CALC: 41.2 % — SIGNIFICANT CHANGE UP (ref 39–50)
HCV AB S/CO SERPL IA: 0.06 S/CO — SIGNIFICANT CHANGE UP (ref 0–0.99)
HCV AB SERPL-IMP: SIGNIFICANT CHANGE UP
HGB BLD-MCNC: 13.5 G/DL — SIGNIFICANT CHANGE UP (ref 13–17)
HYALINE CASTS: 0 /LPF
KETONES URINE: NEGATIVE
LEUKOCYTE ESTERASE URINE: ABNORMAL
MCHC RBC-ENTMCNC: 27.7 PG — SIGNIFICANT CHANGE UP (ref 27–34)
MCHC RBC-ENTMCNC: 32.8 GM/DL — SIGNIFICANT CHANGE UP (ref 32–36)
MCV RBC AUTO: 84.6 FL — SIGNIFICANT CHANGE UP (ref 80–100)
MICROSCOPIC-UA: NORMAL
NITRITE URINE: NEGATIVE
NRBC # BLD: 0 /100 WBCS — SIGNIFICANT CHANGE UP (ref 0–0)
PH URINE: 6
PLATELET # BLD AUTO: 151 K/UL — SIGNIFICANT CHANGE UP (ref 150–400)
POTASSIUM SERPL-MCNC: 4.4 MMOL/L — SIGNIFICANT CHANGE UP (ref 3.5–5.3)
POTASSIUM SERPL-SCNC: 4.4 MMOL/L — SIGNIFICANT CHANGE UP (ref 3.5–5.3)
PROTEIN URINE: ABNORMAL
RBC # BLD: 4.87 M/UL — SIGNIFICANT CHANGE UP (ref 4.2–5.8)
RBC # FLD: 12.6 % — SIGNIFICANT CHANGE UP (ref 10.3–14.5)
RED BLOOD CELLS URINE: 72 /HPF
SODIUM SERPL-SCNC: 143 MMOL/L — SIGNIFICANT CHANGE UP (ref 135–145)
SPECIFIC GRAVITY URINE: 1.03
SPECIMEN SOURCE: SIGNIFICANT CHANGE UP
SQUAMOUS EPITHELIAL CELLS: 2 /HPF
URINE CYTOLOGY: NORMAL
UROBILINOGEN URINE: NORMAL
WBC # BLD: 7.35 K/UL — SIGNIFICANT CHANGE UP (ref 3.8–10.5)
WBC # FLD AUTO: 7.35 K/UL — SIGNIFICANT CHANGE UP (ref 3.8–10.5)
WHITE BLOOD CELLS URINE: 22 /HPF

## 2019-08-21 PROCEDURE — 99233 SBSQ HOSP IP/OBS HIGH 50: CPT

## 2019-08-21 PROCEDURE — 88300 SURGICAL PATH GROSS: CPT | Mod: 26,59

## 2019-08-21 PROCEDURE — 88305 TISSUE EXAM BY PATHOLOGIST: CPT | Mod: 26

## 2019-08-21 PROCEDURE — 52354 CYSTOURETERO W/BIOPSY: CPT | Mod: RT

## 2019-08-21 PROCEDURE — 52318 REMOVE BLADDER STONE: CPT

## 2019-08-21 PROCEDURE — 52235 CYSTOSCOPY AND TREATMENT: CPT | Mod: 59

## 2019-08-21 RX ORDER — PHENAZOPYRIDINE HCL 100 MG
100 TABLET ORAL EVERY 8 HOURS
Refills: 0 | Status: DISCONTINUED | OUTPATIENT
Start: 2019-08-21 | End: 2019-08-21

## 2019-08-21 RX ORDER — HYDROMORPHONE HYDROCHLORIDE 2 MG/ML
0.5 INJECTION INTRAMUSCULAR; INTRAVENOUS; SUBCUTANEOUS
Refills: 0 | Status: DISCONTINUED | OUTPATIENT
Start: 2019-08-21 | End: 2019-08-22

## 2019-08-21 RX ORDER — HYDROMORPHONE HYDROCHLORIDE 2 MG/ML
1 INJECTION INTRAMUSCULAR; INTRAVENOUS; SUBCUTANEOUS
Refills: 0 | Status: DISCONTINUED | OUTPATIENT
Start: 2019-08-21 | End: 2019-08-22

## 2019-08-21 RX ORDER — OXYCODONE AND ACETAMINOPHEN 5; 325 MG/1; MG/1
1 TABLET ORAL EVERY 6 HOURS
Refills: 0 | Status: DISCONTINUED | OUTPATIENT
Start: 2019-08-21 | End: 2019-08-22

## 2019-08-21 RX ORDER — OXYCODONE AND ACETAMINOPHEN 5; 325 MG/1; MG/1
1 TABLET ORAL EVERY 6 HOURS
Refills: 0 | Status: DISCONTINUED | OUTPATIENT
Start: 2019-08-21 | End: 2019-08-21

## 2019-08-21 RX ORDER — SODIUM CHLORIDE 9 MG/ML
1000 INJECTION, SOLUTION INTRAVENOUS
Refills: 0 | Status: DISCONTINUED | OUTPATIENT
Start: 2019-08-21 | End: 2019-08-22

## 2019-08-21 RX ORDER — DOCUSATE SODIUM 100 MG
100 CAPSULE ORAL
Refills: 0 | Status: DISCONTINUED | OUTPATIENT
Start: 2019-08-21 | End: 2019-08-22

## 2019-08-21 RX ORDER — DOCUSATE SODIUM 100 MG
100 CAPSULE ORAL
Refills: 0 | Status: DISCONTINUED | OUTPATIENT
Start: 2019-08-21 | End: 2019-08-21

## 2019-08-21 RX ORDER — CEFTRIAXONE 500 MG/1
1000 INJECTION, POWDER, FOR SOLUTION INTRAMUSCULAR; INTRAVENOUS EVERY 24 HOURS
Refills: 0 | Status: DISCONTINUED | OUTPATIENT
Start: 2019-08-21 | End: 2019-08-22

## 2019-08-21 RX ORDER — SODIUM CHLORIDE 9 MG/ML
1000 INJECTION, SOLUTION INTRAVENOUS
Refills: 0 | Status: DISCONTINUED | OUTPATIENT
Start: 2019-08-21 | End: 2019-08-21

## 2019-08-21 RX ORDER — ACETAMINOPHEN 500 MG
1000 TABLET ORAL ONCE
Refills: 0 | Status: COMPLETED | OUTPATIENT
Start: 2019-08-21 | End: 2019-08-21

## 2019-08-21 RX ORDER — ONDANSETRON 8 MG/1
4 TABLET, FILM COATED ORAL ONCE
Refills: 0 | Status: DISCONTINUED | OUTPATIENT
Start: 2019-08-21 | End: 2019-08-22

## 2019-08-21 RX ORDER — PHENAZOPYRIDINE HCL 100 MG
100 TABLET ORAL EVERY 8 HOURS
Refills: 0 | Status: DISCONTINUED | OUTPATIENT
Start: 2019-08-21 | End: 2019-08-22

## 2019-08-21 RX ADMIN — OXYCODONE AND ACETAMINOPHEN 1 TABLET(S): 5; 325 TABLET ORAL at 15:00

## 2019-08-21 RX ADMIN — Medication 1000 MILLIGRAM(S): at 21:55

## 2019-08-21 RX ADMIN — SODIUM CHLORIDE 125 MILLILITER(S): 9 INJECTION, SOLUTION INTRAVENOUS at 18:34

## 2019-08-21 RX ADMIN — SODIUM CHLORIDE 120 MILLILITER(S): 9 INJECTION, SOLUTION INTRAVENOUS at 20:49

## 2019-08-21 RX ADMIN — Medication 400 MILLIGRAM(S): at 21:07

## 2019-08-21 RX ADMIN — CEFTRIAXONE 100 MILLIGRAM(S): 500 INJECTION, POWDER, FOR SOLUTION INTRAMUSCULAR; INTRAVENOUS at 18:34

## 2019-08-21 RX ADMIN — Medication 100 MILLIGRAM(S): at 14:06

## 2019-08-21 RX ADMIN — OXYCODONE AND ACETAMINOPHEN 1 TABLET(S): 5; 325 TABLET ORAL at 14:06

## 2019-08-21 RX ADMIN — SODIUM CHLORIDE 125 MILLILITER(S): 9 INJECTION, SOLUTION INTRAVENOUS at 11:10

## 2019-08-21 RX ADMIN — OXYCODONE AND ACETAMINOPHEN 1 TABLET(S): 5; 325 TABLET ORAL at 04:12

## 2019-08-21 RX ADMIN — OXYCODONE AND ACETAMINOPHEN 1 TABLET(S): 5; 325 TABLET ORAL at 04:45

## 2019-08-21 NOTE — BRIEF OPERATIVE NOTE - NSICDXBRIEFPROCEDURE_GEN_ALL_CORE_FT
PROCEDURES:  Cystoscopic removal of urethral stone 21-Aug-2019 20:50:20  Kenny Guadarrama  Biopsy of ureter 21-Aug-2019 20:49:47  Kenny Guadarrama  Cystourethroscopy with bladder tumor resection, medium 21-Aug-2019 20:49:18  Kenny Guadarrama  Lithotripsy, using laser 21-Aug-2019 20:48:42  Kenny Guadarrama

## 2019-08-21 NOTE — PROGRESS NOTE ADULT - ASSESSMENT
68 year old Male presents to the ER c/o hematuria and dysuria x 1 week.  He was to have cystoscopy with Dr. Soni as outpatient but came to ED for increased pain in his penis along with bloody urine.  Urology attempted to remove stone in ED, was unsuccessful.  Admitted for further management.

## 2019-08-21 NOTE — CHART NOTE - NSCHARTNOTEFT_GEN_A_CORE
PVR: 200 ml    Value relayed to Dr. Barrera. Hold off on Noyola for now. Repeat PVR later this afternoon.    Will also start Pyridium & Colace.    Pt has been placed on add-on list for today. If procedure cannot be done tonight, pt. is on the OR list for tomorrow.    This was explained to the pt. He understands.

## 2019-08-21 NOTE — PROGRESS NOTE ADULT - PROBLEM SELECTOR PLAN 1
cont ceftriaxone  appreciated urology consult, started on phenazopyridine, colace   ml, for repeat PVR this PM.  No Noyola for now, possible add-on for OR today  Analgesia PRN

## 2019-08-21 NOTE — BRIEF OPERATIVE NOTE - NSICDXBRIEFPOSTOP_GEN_ALL_CORE_FT
POST-OP DIAGNOSIS:  Ureteral polyp 21-Aug-2019 20:52:55  Kenny Guadarrama  Bladder polyps 21-Aug-2019 20:52:47  Kenny Guadarrama  Urethral stone 21-Aug-2019 20:52:39  Kenny Guadarrama

## 2019-08-22 ENCOUNTER — TRANSCRIPTION ENCOUNTER (OUTPATIENT)
Age: 69
End: 2019-08-22

## 2019-08-22 VITALS
SYSTOLIC BLOOD PRESSURE: 137 MMHG | DIASTOLIC BLOOD PRESSURE: 73 MMHG | HEART RATE: 69 BPM | OXYGEN SATURATION: 95 % | TEMPERATURE: 98 F | RESPIRATION RATE: 17 BRPM

## 2019-08-22 LAB
ANION GAP SERPL CALC-SCNC: 7 MMOL/L — SIGNIFICANT CHANGE UP (ref 5–17)
BASOPHILS # BLD AUTO: 0.01 K/UL — SIGNIFICANT CHANGE UP (ref 0–0.2)
BASOPHILS NFR BLD AUTO: 0.1 % — SIGNIFICANT CHANGE UP (ref 0–2)
BUN SERPL-MCNC: 17 MG/DL — SIGNIFICANT CHANGE UP (ref 7–23)
CALCIUM SERPL-MCNC: 8.4 MG/DL — LOW (ref 8.5–10.1)
CHLORIDE SERPL-SCNC: 108 MMOL/L — SIGNIFICANT CHANGE UP (ref 96–108)
CO2 SERPL-SCNC: 25 MMOL/L — SIGNIFICANT CHANGE UP (ref 22–31)
CREAT SERPL-MCNC: 1.22 MG/DL — SIGNIFICANT CHANGE UP (ref 0.5–1.3)
EOSINOPHIL # BLD AUTO: 0 K/UL — SIGNIFICANT CHANGE UP (ref 0–0.5)
EOSINOPHIL NFR BLD AUTO: 0 % — SIGNIFICANT CHANGE UP (ref 0–6)
GLUCOSE SERPL-MCNC: 168 MG/DL — HIGH (ref 70–99)
HCT VFR BLD CALC: 39.6 % — SIGNIFICANT CHANGE UP (ref 39–50)
HGB BLD-MCNC: 13.2 G/DL — SIGNIFICANT CHANGE UP (ref 13–17)
IMM GRANULOCYTES NFR BLD AUTO: 0.3 % — SIGNIFICANT CHANGE UP (ref 0–1.5)
LYMPHOCYTES # BLD AUTO: 0.53 K/UL — LOW (ref 1–3.3)
LYMPHOCYTES # BLD AUTO: 5.1 % — LOW (ref 13–44)
MAGNESIUM SERPL-MCNC: 2.5 MG/DL — SIGNIFICANT CHANGE UP (ref 1.6–2.6)
MCHC RBC-ENTMCNC: 27.6 PG — SIGNIFICANT CHANGE UP (ref 27–34)
MCHC RBC-ENTMCNC: 33.3 GM/DL — SIGNIFICANT CHANGE UP (ref 32–36)
MCV RBC AUTO: 82.7 FL — SIGNIFICANT CHANGE UP (ref 80–100)
MONOCYTES # BLD AUTO: 0.4 K/UL — SIGNIFICANT CHANGE UP (ref 0–0.9)
MONOCYTES NFR BLD AUTO: 3.9 % — SIGNIFICANT CHANGE UP (ref 2–14)
NEUTROPHILS # BLD AUTO: 9.33 K/UL — HIGH (ref 1.8–7.4)
NEUTROPHILS NFR BLD AUTO: 90.6 % — HIGH (ref 43–77)
NRBC # BLD: 0 /100 WBCS — SIGNIFICANT CHANGE UP (ref 0–0)
PHOSPHATE SERPL-MCNC: 2.9 MG/DL — SIGNIFICANT CHANGE UP (ref 2.5–4.5)
PLATELET # BLD AUTO: 165 K/UL — SIGNIFICANT CHANGE UP (ref 150–400)
POTASSIUM SERPL-MCNC: 3.8 MMOL/L — SIGNIFICANT CHANGE UP (ref 3.5–5.3)
POTASSIUM SERPL-SCNC: 3.8 MMOL/L — SIGNIFICANT CHANGE UP (ref 3.5–5.3)
RBC # BLD: 4.79 M/UL — SIGNIFICANT CHANGE UP (ref 4.2–5.8)
RBC # FLD: 12.5 % — SIGNIFICANT CHANGE UP (ref 10.3–14.5)
SODIUM SERPL-SCNC: 140 MMOL/L — SIGNIFICANT CHANGE UP (ref 135–145)
WBC # BLD: 10.3 K/UL — SIGNIFICANT CHANGE UP (ref 3.8–10.5)
WBC # FLD AUTO: 10.3 K/UL — SIGNIFICANT CHANGE UP (ref 3.8–10.5)

## 2019-08-22 PROCEDURE — 99232 SBSQ HOSP IP/OBS MODERATE 35: CPT

## 2019-08-22 PROCEDURE — 99239 HOSP IP/OBS DSCHRG MGMT >30: CPT

## 2019-08-22 RX ORDER — TAMSULOSIN HYDROCHLORIDE 0.4 MG/1
1 CAPSULE ORAL
Qty: 0 | Refills: 0 | DISCHARGE
Start: 2019-08-22

## 2019-08-22 RX ORDER — TAMSULOSIN HYDROCHLORIDE 0.4 MG/1
1 CAPSULE ORAL
Qty: 30 | Refills: 1
Start: 2019-08-22 | End: 2019-10-20

## 2019-08-22 RX ORDER — TAMSULOSIN HYDROCHLORIDE 0.4 MG/1
0.4 CAPSULE ORAL AT BEDTIME
Refills: 0 | Status: DISCONTINUED | OUTPATIENT
Start: 2019-08-22 | End: 2019-08-22

## 2019-08-22 RX ADMIN — Medication 100 MILLIGRAM(S): at 05:55

## 2019-08-22 RX ADMIN — SODIUM CHLORIDE 120 MILLILITER(S): 9 INJECTION, SOLUTION INTRAVENOUS at 00:33

## 2019-08-22 RX ADMIN — Medication 100 MILLIGRAM(S): at 13:05

## 2019-08-22 RX ADMIN — Medication 100 MILLIGRAM(S): at 00:32

## 2019-08-22 NOTE — DISCHARGE NOTE NURSING/CASE MANAGEMENT/SOCIAL WORK - NSDCDPATPORTLINK_GEN_ALL_CORE
You can access the [facility] Patient Portal, offered by [facility], by registering with the following website: [email or web address]

## 2019-08-22 NOTE — DISCHARGE NOTE PROVIDER - NSDCCPCAREPLAN_GEN_ALL_CORE_FT
PRINCIPAL DISCHARGE DIAGNOSIS  Diagnosis: Hematuria  Assessment and Plan of Treatment: -Stone removed, follow with Dr. Soni.  Complete 5 days of Levaquin as prescribed.  Take Flomax (helps with urination) daily as prescribed.      SECONDARY DISCHARGE DIAGNOSES  Diagnosis: Urethra, calculus  Assessment and Plan of Treatment:

## 2019-08-22 NOTE — PROGRESS NOTE ADULT - SUBJECTIVE AND OBJECTIVE BOX
Patient seen and examined bedside resting comfortably.  No complaints offered.   Denies nausea and vomiting. Tolerating diet.  Denies chest pain, dyspnea, cough.    T(F): 97.6 (08-22-19 @ 05:05), Max: 98.4 (08-21-19 @ 20:45)  HR: 65 (08-22-19 @ 05:05) (54 - 72)  BP: 138/72 (08-22-19 @ 05:05) (138/72 - 163/82)  RR: 16 (08-22-19 @ 05:05) (12 - 18)  SpO2: 92% (08-22-19 @ 05:05) (91% - 100%)  Wt(kg): --  CAPILLARY BLOOD GLUCOSE          PHYSICAL EXAM:  General: NAD, alert and awake  HEENT: NCAT, EOMI, conjunctiva clear  Chest: nonlabored respirations, good inspiratory effort  Abdomen: soft, NTND.   Extremities: no pedal edema or calf tenderness noted   : No CVA or SP tenderness. Noyola in Situ draining well, Deep yellow urine 2/2 Pyridium    LABS:                        13.2   10.30 )-----------( 165      ( 22 Aug 2019 07:12 )             39.6   08-22    140  |  108  |  17  ----------------------------<  168<H>  3.8   |  25  |  1.22    Ca    8.4<L>      22 Aug 2019 07:12  Phos  2.9     08-22  Mg     2.5     08-22    TPro  7.5  /  Alb  3.6  /  TBili  0.3  /  DBili  x   /  AST  8<L>  /  ALT  23  /  AlkPhos  86  08-20  PT/INR - ( 20 Aug 2019 14:32 )   PT: 13.3 sec;   INR: 1.18 ratio         PTT - ( 20 Aug 2019 14:32 )  PTT:27.2 sec  I&O's Detail    21 Aug 2019 07:01  -  22 Aug 2019 07:00  --------------------------------------------------------  IN:    lactated ringers.: 1000 mL    lactated ringers.: 120 mL    Solution: 100 mL    Solution: 50 mL  Total IN: 1270 mL    OUT:    Indwelling Catheter - Urethral: 3800 mL    Voided: 1150 mL  Total OUT: 4950 mL    Total NET: -3680 mL          Impression: 68y Male admitted with Hematuria, Distal Penile urethral stone, IRENE, s/p Cystoscopic removal of urethral stone, Biopsy of ureter, Cystourethroscopy with bladder tumor resection, Lithotripsy, using laser 21-Aug-2019  PMH   No pertinent past medical history      Plan:  - D/C Noyola catheter after void check , May be d/c home on PO antibiotics.   - Follow up with Dr Guadarrama in office for pathology
Patient seen and examined bedside resting comfortably. at this time.  He states his pain right now is about 2/10. Every few minutes, when he passes bloody urine, pain will go up to 10/10. It will eventually decrease till the next episode. The episodes are becoming closer spaced over night.  Denies nausea and vomiting. Tolerating diet.  Flatus/BM. +  Denies chest pain, dyspnea, cough.    T(F): 98.4 (08-21-19 @ 05:21), Max: 98.8 (08-20-19 @ 23:20)  HR: 68 (08-21-19 @ 05:21) (63 - 69)  BP: 154/81 (08-21-19 @ 05:21) (142/93 - 154/81)  RR: 18 (08-21-19 @ 05:21) (15 - 18)  SpO2: 94% (08-21-19 @ 05:21) (94% - 98%)    PHYSICAL EXAM:  General: NAD  Neuro:  Alert & oriented x 3  Abdomen: soft, NTND. Normactive BS  : evidence of bleeding from the tip of the penis. Urinary bladder appears distended.      LABS:                        13.5   7.35  )-----------( 151      ( 21 Aug 2019 07:08 )             41.2     08-21    143  |  113<H>  |  18  ----------------------------<  114<H>  4.4   |  25  |  2.24<H>    Ca    8.4<L>      21 Aug 2019 07:08    Urinalysis + Microscopic Examination (08.20.19 @ 14:32)    Urine Appearance: very cloudy    Urobilinogen: Negative mg/dL    Specific Gravity: 1.015    Protein, Urine: 100 mg/dL    pH Urine: 6.5    Leukocyte Esterase Concentration: Small    Nitrite: Negative    Ketone - Urine: Trace    Bilirubin: Negative    Color: Red    Glucose Qualitative, Urine: 100 mg/dL    Blood, Urine: Large    Red Blood Cell - Urine: >50 /HPF    White Blood Cell - Urine: 3-5    Epithelial Cells: Occasional    Bacteria: Few        < from: CT Renal Stone Hunt (08.20.19 @ 15:40) >    EXAM:  CT RENAL STONE HUNT                            PROCEDURE DATE:  08/20/2019          INTERPRETATION:  CT of the abdomen and pelvis without IV or oral contrast    Clinical Indication: hematuria    Technique: Axial multidetector CT images of the abdomen and pelvis are   acquired without IV or oral contrast.    Comparison: 8/14/2019    Findings: Limited sections through the lung bases demonstrate mild   bilateral bronchiectasis. Small bilateral atelectasis. Small blebs and   bullae in the lower lung zone as bilaterally.    No evidence for a ureteral calculus. No hydronephrosis. Small   nonobstructive calculi in the left kidney. Small hypodense lesion in the   left kidney, representing a probable cyst. There is a 1.0 cm isodense   lesion in the right kidney (image 43 series 2). Right extrarenal pelvis.    Allowing for the noncontrast technique, the liver, gallbladder, pancreas,   spleen, and the right adrenal appear grossly unremarkable. Nonspecific   1.7 cm left adrenal nodule with aHounsfield unit of 26.    The appendix appears normal. Colonic diverticulosis without evidence for   diverticulitis. No bowel obstruction or grossly thickened bowel wall.    No evidence for free air, ascites, or enlarged lymph node.    The urinary bladder appears grossly unremarkable. Stable enlargement of   the prostate.    Previously noted 1.2 cm central calcification in the prostate has   migrated to the distal penis, likely representing a stone in the penile   urethra.    Impression: Mild bilateral bronchiectasis.    No evidence for a ureteral calculus. No hydronephrosis. Small   nonobstructive calculi in the left kidney.  Previously noted 1.2 cm   central calcification in the prostate has migrated to the distal penis,   likely representing a stone in the penile urethra.    1.0 cm isodense lesion in the right kidney. Abdominal MR without and with   IV contrast may be pursued for further evaluation on a nonemergent   outpatient basis.    CRISELDA DAMON M.D., ATTENDING RADIOLOGIST  This document has been electronically signed. Aug 20 2019  4:07PM            I&O's Detail    20 Aug 2019 07:01  -  21 Aug 2019 07:00  --------------------------------------------------------  IN:  Total IN: 0 mL    OUT:    Voided: 150 mL  Total OUT: 150 mL    Total NET: -150 mL      21 Aug 2019 07:01  -  21 Aug 2019 09:27  --------------------------------------------------------  IN:  Total IN: 0 mL    OUT:    Voided: 100 mL  Total OUT: 100 mL    Total NET: -100 mL          Impression: 68y Male admitted with HEMATURIA/URETHRAL CALCULUS  PAIN AND BLEEDING IN PENIS      Plan:   - continue IVAB  - continue medical f/u  -Increase activity with PT, OOB, Ambulate  - await latest PVR- pt may need Noyola Cath  -will discuss with Dr. Barrera
68 year old Male presents to the ER c/o hematuria and dysuria x 1 week.  He was to have cystoscopy with Dr. Soni as outpatient but came to ED for increased pain in his penis along with bloody urine.  Urology attempted to remove stone in ED, was unsuccessful.  Admitted for further management.    Overnight:  Pt seen at bedside, stated he is comfortable until he needs to urinate; has been urinating frequently and is still passing bloody urine.    REVIEW OF SYSTEMS:  + Hematuria.  No fever/chills, No photophobia/eye pain/changes in vision,  No chest pain/palpitations, no SOB/cough/wheeze/stridor, No abdominal pain, No N/V/D, No neck/back pain, no rash, no changes in neurological status/function.      MEDICATIONS  (STANDING):  cefTRIAXone   IVPB 1000 milliGRAM(s) IV Intermittent every 24 hours  docusate sodium 100 milliGRAM(s) Oral two times a day  lactated ringers. 1000 milliLiter(s) (125 mL/Hr) IV Continuous <Continuous>  phenazopyridine 100 milliGRAM(s) Oral every 8 hours    MEDICATIONS  (PRN):  oxyCODONE    5 mG/acetaminophen 325 mG 1 Tablet(s) Oral every 6 hours PRN Moderate Pain (4 - 6)      Allergies    No Known Allergies      Vital Signs Last 24 Hrs  T(C): 36.7 (21 Aug 2019 11:00), Max: 37.1 (20 Aug 2019 23:20)  T(F): 98.1 (21 Aug 2019 11:00), Max: 98.8 (20 Aug 2019 23:20)  HR: 70 (21 Aug 2019 11:00) (63 - 70)  BP: 144/74 (21 Aug 2019 11:00) (143/79 - 154/81)  BP(mean): --  RR: 17 (21 Aug 2019 11:00) (16 - 18)  SpO2: 97% (21 Aug 2019 11:00) (94% - 97%)    PHYSICAL EXAM:  GENERAL: NAD, well-groomed, well-developed  HEAD:  Atraumatic, Normocephalic  EYES: EOMI, PERRLA, conjunctiva and sclera clear  ENMT: No tonsillar erythema, exudates, or enlargement; Moist mucous membranes, No lesions  NECK: Supple, No JVD, Normal thyroid  NERVOUS SYSTEM:  Alert & Oriented X3, Good concentration  CHEST/LUNG: Clear to ascultation bilaterally; No rales, rhonchi, wheezing, or rubs  HEART: Regular rate and rhythm; No murmurs, rubs, or gallops  ABDOMEN: Soft, Nontender, Nondistended; Bowel sounds present  EXTREMITIES: no clubbing, cyanosis, or edema  : evidence of bleeding from the tip of the penis. Urinary bladder appears distended.      LABS:                        13.5   7.35  )-----------( 151      ( 21 Aug 2019 07:08 )             41.2     08-    143  |  113<H>  |  18  ----------------------------<  114<H>  4.4   |  25  |  2.24<H>    Ca    8.4<L>      21 Aug 2019 07:08    TPro  7.5  /  Alb  3.6  /  TBili  0.3  /  DBili  x   /  AST  8<L>  /  ALT  23  /  AlkPhos  86      PT/INR - ( 20 Aug 2019 14:32 )   PT: 13.3 sec;   INR: 1.18 ratio         PTT - ( 20 Aug 2019 14:32 )  PTT:27.2 sec  Urinalysis Basic - ( 20 Aug 2019 14:32 )    Color: Red / Appearance: very cloudy / S.015 / pH: x  Gluc: x / Ketone: Trace  / Bili: Negative / Urobili: Negative mg/dL   Blood: x / Protein: 100 mg/dL / Nitrite: Negative   Leuk Esterase: Small / RBC: >50 /HPF / WBC 3-5   Sq Epi: x / Non Sq Epi: Occasional / Bacteria: Few

## 2019-08-22 NOTE — DISCHARGE NOTE PROVIDER - CARE PROVIDER_API CALL
Bita Barrera)  Urology  [address] [place] [address]  Phone: [phone]  Fax: [phone]  Follow Up Time:

## 2019-08-22 NOTE — CHART NOTE - NSCHARTNOTEFT_GEN_A_CORE
To Whom It May Concern:    Mr. Haile was under our care at [facility] from 8/20/19 to 8/22/19.  Please excuse him from work during those days; he may return to work 8/23/19 without restrictions.    Please call [phone] with any questions.      -Kayode Morejon MD

## 2019-08-22 NOTE — DISCHARGE NOTE PROVIDER - NSDCFUSCHEDAPPT_GEN_ALL_CORE_FT
SHANE SCHMITT ; 08/29/2019 ; [facility] [address] ; 08/29/2019 ; [facility] [address]

## 2019-08-22 NOTE — DISCHARGE NOTE PROVIDER - HOSPITAL COURSE
68 year old Male presents to the ER c/o hematuria and dysuria x 1 week.  He was to have cystoscopy with Dr. Soni as outpatient but came to ED for increased pain in his penis along with bloody urine.  Urology attempted to remove stone in ED, was unsuccessful.  Admitted for further management.        Pt was admitted, treated for UTI with IV Rocephin and urethral stone was removed in OR by urology.      Pt stable for discharge on PO Levaquin with Flomax and outpatient follow up with his urologist Dr. Barrera.

## 2019-08-23 LAB — SURGICAL PATHOLOGY STUDY: SIGNIFICANT CHANGE UP

## 2019-08-23 RX ORDER — CIPROFLOXACIN LACTATE 400MG/40ML
1 VIAL (ML) INTRAVENOUS
Qty: 5 | Refills: 0
Start: 2019-08-23 | End: 2019-08-27

## 2019-08-26 DIAGNOSIS — Z79.2 LONG TERM (CURRENT) USE OF ANTIBIOTICS: ICD-10-CM

## 2019-08-26 DIAGNOSIS — N28.89 OTHER SPECIFIED DISORDERS OF KIDNEY AND URETER: ICD-10-CM

## 2019-08-26 DIAGNOSIS — D41.4 NEOPLASM OF UNCERTAIN BEHAVIOR OF BLADDER: ICD-10-CM

## 2019-08-26 DIAGNOSIS — N21.1 CALCULUS IN URETHRA: ICD-10-CM

## 2019-08-26 DIAGNOSIS — N39.0 URINARY TRACT INFECTION, SITE NOT SPECIFIED: ICD-10-CM

## 2019-08-26 DIAGNOSIS — R31.0 GROSS HEMATURIA: ICD-10-CM

## 2019-08-27 LAB — NIDUS STONE QN: SIGNIFICANT CHANGE UP

## 2019-08-29 ENCOUNTER — APPOINTMENT (OUTPATIENT)
Dept: UROLOGY | Facility: CLINIC | Age: 69
End: 2019-08-29

## 2019-08-29 ENCOUNTER — APPOINTMENT (OUTPATIENT)
Dept: UROLOGY | Facility: CLINIC | Age: 69
End: 2019-08-29
Payer: COMMERCIAL

## 2019-08-29 VITALS
SYSTOLIC BLOOD PRESSURE: 126 MMHG | BODY MASS INDEX: 24.95 KG/M2 | OXYGEN SATURATION: 96 % | HEIGHT: 70.5 IN | WEIGHT: 176.25 LBS | HEART RATE: 64 BPM | TEMPERATURE: 98.1 F | DIASTOLIC BLOOD PRESSURE: 75 MMHG

## 2019-08-29 PROCEDURE — 51798 US URINE CAPACITY MEASURE: CPT

## 2019-08-29 PROCEDURE — 99213 OFFICE O/P EST LOW 20 MIN: CPT | Mod: 25

## 2019-08-29 NOTE — HISTORY OF PRESENT ILLNESS
[FreeTextEntry1] : s/p cysto, removal urethral stone, TURB and biopsy of Right ureter.\par Path: negative.? right ureterocele.\par

## 2019-08-29 NOTE — ASSESSMENT
[FreeTextEntry1] : s/p removal of urethral stone. Hematuria\par urine culture\par will need follow up cystoscopy, uroflow and PVR

## 2019-08-29 NOTE — PHYSICAL EXAM
[General Appearance - Well Developed] : well developed [General Appearance - Well Nourished] : well nourished [Normal Appearance] : normal appearance [Well Groomed] : well groomed [General Appearance - In No Acute Distress] : no acute distress [Abdomen Soft] : soft [Abdomen Tenderness] : non-tender [Costovertebral Angle Tenderness] : no ~M costovertebral angle tenderness [Urethral Meatus] : meatus normal [Urinary Bladder Findings] : the bladder was normal on palpation [Scrotum] : the scrotum was normal [Testes Mass (___cm)] : there were no testicular masses [] : no respiratory distress [Edema] : no peripheral edema [Respiration, Rhythm And Depth] : normal respiratory rhythm and effort [Exaggerated Use Of Accessory Muscles For Inspiration] : no accessory muscle use [Oriented To Time, Place, And Person] : oriented to person, place, and time [Mood] : the mood was normal [Affect] : the affect was normal [Normal Station and Gait] : the gait and station were normal for the patient's age [Not Anxious] : not anxious [No Focal Deficits] : no focal deficits [No Palpable Adenopathy] : no palpable adenopathy

## 2019-09-03 LAB
APPEARANCE: ABNORMAL
BACTERIA UR CULT: NORMAL
BACTERIA: NEGATIVE
BILIRUBIN URINE: NEGATIVE
BLOOD URINE: ABNORMAL
COLOR: ABNORMAL
GLUCOSE QUALITATIVE U: NEGATIVE
HYALINE CASTS: 4 /LPF
KETONES URINE: NEGATIVE
LEUKOCYTE ESTERASE URINE: ABNORMAL
MICROSCOPIC-UA: NORMAL
NITRITE URINE: NEGATIVE
PH URINE: 6
PROTEIN URINE: ABNORMAL
RED BLOOD CELLS URINE: 100 /HPF
SPECIFIC GRAVITY URINE: 1.03
SQUAMOUS EPITHELIAL CELLS: 3 /HPF
UROBILINOGEN URINE: NORMAL
WHITE BLOOD CELLS URINE: 61 /HPF

## 2019-10-10 ENCOUNTER — APPOINTMENT (OUTPATIENT)
Dept: UROLOGY | Facility: CLINIC | Age: 69
End: 2019-10-10
Payer: COMMERCIAL

## 2019-10-10 VITALS — SYSTOLIC BLOOD PRESSURE: 130 MMHG | HEART RATE: 93 BPM | DIASTOLIC BLOOD PRESSURE: 74 MMHG | TEMPERATURE: 98.2 F

## 2019-10-10 PROCEDURE — 99213 OFFICE O/P EST LOW 20 MIN: CPT | Mod: 25

## 2019-10-10 PROCEDURE — 51798 US URINE CAPACITY MEASURE: CPT | Mod: 59

## 2019-10-10 PROCEDURE — 51741 ELECTRO-UROFLOWMETRY FIRST: CPT

## 2019-10-10 RX ORDER — SILDENAFIL 20 MG/1
20 TABLET ORAL DAILY
Qty: 30 | Refills: 1 | Status: ACTIVE | COMMUNITY
Start: 2019-10-10 | End: 1900-01-01

## 2019-10-10 RX ORDER — TAMSULOSIN HYDROCHLORIDE 0.4 MG/1
0.4 CAPSULE ORAL
Refills: 0 | Status: DISCONTINUED | COMMUNITY
End: 2019-10-10

## 2019-10-10 NOTE — HISTORY OF PRESENT ILLNESS
[FreeTextEntry1] : s/p cysto, removal urethral stone, TURB and biopsy of Right ureter.\par Path: negative.? right ureterocele.\par \par 10/10/2019: voiding well.\par \par

## 2019-10-10 NOTE — PHYSICAL EXAM
[General Appearance - Well Developed] : well developed [General Appearance - Well Nourished] : well nourished [Well Groomed] : well groomed [Normal Appearance] : normal appearance [Abdomen Tenderness] : non-tender [General Appearance - In No Acute Distress] : no acute distress [Abdomen Soft] : soft [Urethral Meatus] : meatus normal [Costovertebral Angle Tenderness] : no ~M costovertebral angle tenderness [Urinary Bladder Findings] : the bladder was normal on palpation [Testes Mass (___cm)] : there were no testicular masses [Scrotum] : the scrotum was normal [Edema] : no peripheral edema [] : no respiratory distress [Respiration, Rhythm And Depth] : normal respiratory rhythm and effort [Exaggerated Use Of Accessory Muscles For Inspiration] : no accessory muscle use [Oriented To Time, Place, And Person] : oriented to person, place, and time [Affect] : the affect was normal [Mood] : the mood was normal [Not Anxious] : not anxious [Normal Station and Gait] : the gait and station were normal for the patient's age [No Palpable Adenopathy] : no palpable adenopathy [No Focal Deficits] : no focal deficits

## 2019-11-19 ENCOUNTER — APPOINTMENT (OUTPATIENT)
Dept: UROLOGY | Facility: CLINIC | Age: 69
End: 2019-11-19
Payer: COMMERCIAL

## 2019-11-19 VITALS
SYSTOLIC BLOOD PRESSURE: 153 MMHG | DIASTOLIC BLOOD PRESSURE: 90 MMHG | RESPIRATION RATE: 16 BRPM | TEMPERATURE: 98.1 F | HEART RATE: 58 BPM

## 2019-11-19 DIAGNOSIS — N20.0 CALCULUS OF KIDNEY: ICD-10-CM

## 2019-11-19 DIAGNOSIS — N42.0 CALCULUS OF PROSTATE: ICD-10-CM

## 2019-11-19 DIAGNOSIS — R31.0 GROSS HEMATURIA: ICD-10-CM

## 2019-11-19 DIAGNOSIS — R30.0 DYSURIA: ICD-10-CM

## 2019-11-19 DIAGNOSIS — N52.9 MALE ERECTILE DYSFUNCTION, UNSPECIFIED: ICD-10-CM

## 2019-11-19 PROCEDURE — 51741 ELECTRO-UROFLOWMETRY FIRST: CPT

## 2019-11-19 PROCEDURE — 51798 US URINE CAPACITY MEASURE: CPT | Mod: 59

## 2019-11-19 PROCEDURE — 99213 OFFICE O/P EST LOW 20 MIN: CPT | Mod: 25

## 2019-11-19 RX ORDER — TADALAFIL 20 MG/1
20 TABLET ORAL
Qty: 20 | Refills: 0 | Status: ACTIVE | COMMUNITY
Start: 2019-11-19 | End: 1900-01-01

## 2019-11-19 NOTE — PHYSICAL EXAM
[General Appearance - Well Developed] : well developed [General Appearance - Well Nourished] : well nourished [Normal Appearance] : normal appearance [Well Groomed] : well groomed [General Appearance - In No Acute Distress] : no acute distress [Abdomen Soft] : soft [Abdomen Tenderness] : non-tender [Costovertebral Angle Tenderness] : no ~M costovertebral angle tenderness [Urethral Meatus] : meatus normal [Urinary Bladder Findings] : the bladder was normal on palpation [Scrotum] : the scrotum was normal [Testes Mass (___cm)] : there were no testicular masses [Edema] : no peripheral edema [] : no respiratory distress [Respiration, Rhythm And Depth] : normal respiratory rhythm and effort [Exaggerated Use Of Accessory Muscles For Inspiration] : no accessory muscle use [Oriented To Time, Place, And Person] : oriented to person, place, and time [Affect] : the affect was normal [Mood] : the mood was normal [Normal Station and Gait] : the gait and station were normal for the patient's age [Not Anxious] : not anxious [No Focal Deficits] : no focal deficits [No Palpable Adenopathy] : no palpable adenopathy

## 2019-11-19 NOTE — HISTORY OF PRESENT ILLNESS
[FreeTextEntry1] : s/p cysto, removal urethral stone, TURB and biopsy of Right ureter.\par Path: negative.? right ureterocele.\par \par 10/10/2019: voiding well.\par 11/19/2019: voiding well\par Erectile dysfunction.Sildenafil did not work. Had burning in stomach and no improvement\par \par

## 2019-12-19 ENCOUNTER — APPOINTMENT (OUTPATIENT)
Dept: UROLOGY | Facility: CLINIC | Age: 69
End: 2019-12-19

## 2022-03-24 NOTE — H&P ADULT - ASSESSMENT
Case: [id number] Date/Time: 03/24/22 0715    Procedure: GASTROSCOPY-WITH POSSIBLE DILATION    Location: [facility] ROOM 26 / SURGERY SAME DAY [facility]    Surgeons: Alireza Snider M.D.        28 yo man with dysphagia.    Asthma (no recent exacerbations, PRN inhaler use), GERD, eczema, BOB (compliant with CPAP).    Vapes regularly. Denies alcohol use. No N/V    Relevant Problems   ANESTHESIA   (positive) BOB (obstructive sleep apnea)      PULMONARY   (positive) Intermittent asthma      GI   (positive) GERD (gastroesophageal reflux disease)       Physical Exam    Airway   Mallampati: I  TM distance: >3 FB  Neck ROM: full       Cardiovascular - normal exam  Rhythm: regular  Rate: normal  (-) murmur     Dental - normal exam        Facial Hair   Pulmonary - normal exam  Breath sounds clear to auscultation     Abdominal    Neurological - normal exam                 Anesthesia Plan    ASA 2       Plan - general       Airway plan will be natural airway          Induction: intravenous    Postoperative Plan: Postoperative administration of opioids is intended.    Pertinent diagnostic labs and testing reviewed    Informed Consent:    Anesthetic plan and risks discussed with patient.    Use of blood products discussed with: patient whom consented to blood products.          pt w/urinary colic, hematuria and carmen

## 2022-05-03 NOTE — ED ADULT NURSE NOTE - NSIMPLEMENTINTERV_GEN_ALL_ED
Implemented All Universal Safety Interventions:  [place] to call system. Call bell, personal items and telephone within reach. Instruct patient to call for assistance. Room bathroom lighting operational. Non-slip footwear when patient is off stretcher. Physically safe environment: no spills, clutter or unnecessary equipment. Stretcher in lowest position, wheels locked, appropriate side rails in place.
see MDM

## 2022-05-31 ENCOUNTER — INPATIENT (INPATIENT)
Facility: HOSPITAL | Age: 72
LOS: 1 days | Discharge: HOME | End: 2022-06-02
Attending: HOSPITALIST | Admitting: HOSPITALIST
Payer: COMMERCIAL

## 2022-05-31 VITALS
HEIGHT: 70 IN | TEMPERATURE: 98 F | DIASTOLIC BLOOD PRESSURE: 98 MMHG | WEIGHT: 179.02 LBS | SYSTOLIC BLOOD PRESSURE: 171 MMHG | HEART RATE: 54 BPM | RESPIRATION RATE: 16 BRPM | OXYGEN SATURATION: 99 %

## 2022-05-31 DIAGNOSIS — R07.89 OTHER CHEST PAIN: ICD-10-CM

## 2022-05-31 DIAGNOSIS — E78.5 HYPERLIPIDEMIA, UNSPECIFIED: ICD-10-CM

## 2022-05-31 DIAGNOSIS — Z98.890 OTHER SPECIFIED POSTPROCEDURAL STATES: Chronic | ICD-10-CM

## 2022-05-31 DIAGNOSIS — R73.9 HYPERGLYCEMIA, UNSPECIFIED: ICD-10-CM

## 2022-05-31 DIAGNOSIS — I10 ESSENTIAL (PRIMARY) HYPERTENSION: ICD-10-CM

## 2022-05-31 LAB
ALBUMIN SERPL ELPH-MCNC: 3.5 G/DL — SIGNIFICANT CHANGE UP (ref 3.3–5)
ALP SERPL-CCNC: 89 U/L — SIGNIFICANT CHANGE UP (ref 40–120)
ALT FLD-CCNC: 11 U/L — LOW (ref 12–78)
ANION GAP SERPL CALC-SCNC: 8 MMOL/L — SIGNIFICANT CHANGE UP (ref 5–17)
APTT BLD: 29.7 SEC — SIGNIFICANT CHANGE UP (ref 27.5–35.5)
AST SERPL-CCNC: 10 U/L — LOW (ref 15–37)
BASOPHILS # BLD AUTO: 0.03 K/UL — SIGNIFICANT CHANGE UP (ref 0–0.2)
BASOPHILS NFR BLD AUTO: 0.5 % — SIGNIFICANT CHANGE UP (ref 0–2)
BILIRUB SERPL-MCNC: 0.5 MG/DL — SIGNIFICANT CHANGE UP (ref 0.2–1.2)
BUN SERPL-MCNC: 9 MG/DL — SIGNIFICANT CHANGE UP (ref 7–23)
CALCIUM SERPL-MCNC: 8.8 MG/DL — SIGNIFICANT CHANGE UP (ref 8.5–10.1)
CHLORIDE SERPL-SCNC: 106 MMOL/L — SIGNIFICANT CHANGE UP (ref 96–108)
CK MB BLD-MCNC: <1.9 % — SIGNIFICANT CHANGE UP (ref 0–3.5)
CK MB BLD-MCNC: <2.5 % — SIGNIFICANT CHANGE UP (ref 0–3.5)
CK MB CFR SERPL CALC: <1 NG/ML — SIGNIFICANT CHANGE UP (ref 0.5–3.6)
CK MB CFR SERPL CALC: <1 NG/ML — SIGNIFICANT CHANGE UP (ref 0.5–3.6)
CK SERPL-CCNC: 40 U/L — SIGNIFICANT CHANGE UP (ref 26–308)
CK SERPL-CCNC: 54 U/L — SIGNIFICANT CHANGE UP (ref 26–308)
CO2 SERPL-SCNC: 24 MMOL/L — SIGNIFICANT CHANGE UP (ref 22–31)
CREAT SERPL-MCNC: 0.87 MG/DL — SIGNIFICANT CHANGE UP (ref 0.5–1.3)
EGFR: 92 ML/MIN/1.73M2 — SIGNIFICANT CHANGE UP
EOSINOPHIL # BLD AUTO: 0.11 K/UL — SIGNIFICANT CHANGE UP (ref 0–0.5)
EOSINOPHIL NFR BLD AUTO: 1.9 % — SIGNIFICANT CHANGE UP (ref 0–6)
FLUAV AG NPH QL: SIGNIFICANT CHANGE UP
FLUBV AG NPH QL: SIGNIFICANT CHANGE UP
GLUCOSE SERPL-MCNC: 263 MG/DL — HIGH (ref 70–99)
HCT VFR BLD CALC: 43.5 % — SIGNIFICANT CHANGE UP (ref 39–50)
HGB BLD-MCNC: 14.6 G/DL — SIGNIFICANT CHANGE UP (ref 13–17)
IMM GRANULOCYTES NFR BLD AUTO: 0.2 % — SIGNIFICANT CHANGE UP (ref 0–1.5)
INR BLD: 1.02 RATIO — SIGNIFICANT CHANGE UP (ref 0.88–1.16)
LYMPHOCYTES # BLD AUTO: 1.3 K/UL — SIGNIFICANT CHANGE UP (ref 1–3.3)
LYMPHOCYTES # BLD AUTO: 22.8 % — SIGNIFICANT CHANGE UP (ref 13–44)
MCHC RBC-ENTMCNC: 27.4 PG — SIGNIFICANT CHANGE UP (ref 27–34)
MCHC RBC-ENTMCNC: 33.6 G/DL — SIGNIFICANT CHANGE UP (ref 32–36)
MCV RBC AUTO: 81.8 FL — SIGNIFICANT CHANGE UP (ref 80–100)
MONOCYTES # BLD AUTO: 0.4 K/UL — SIGNIFICANT CHANGE UP (ref 0–0.9)
MONOCYTES NFR BLD AUTO: 7 % — SIGNIFICANT CHANGE UP (ref 2–14)
NEUTROPHILS # BLD AUTO: 3.85 K/UL — SIGNIFICANT CHANGE UP (ref 1.8–7.4)
NEUTROPHILS NFR BLD AUTO: 67.6 % — SIGNIFICANT CHANGE UP (ref 43–77)
NRBC # BLD: 0 /100 WBCS — SIGNIFICANT CHANGE UP (ref 0–0)
PLATELET # BLD AUTO: 170 K/UL — SIGNIFICANT CHANGE UP (ref 150–400)
POTASSIUM SERPL-MCNC: 4.5 MMOL/L — SIGNIFICANT CHANGE UP (ref 3.5–5.3)
POTASSIUM SERPL-SCNC: 4.5 MMOL/L — SIGNIFICANT CHANGE UP (ref 3.5–5.3)
PROT SERPL-MCNC: 7.2 GM/DL — SIGNIFICANT CHANGE UP (ref 6–8.3)
PROTHROM AB SERPL-ACNC: 12.2 SEC — SIGNIFICANT CHANGE UP (ref 10.5–13.4)
RBC # BLD: 5.32 M/UL — SIGNIFICANT CHANGE UP (ref 4.2–5.8)
RBC # FLD: 13 % — SIGNIFICANT CHANGE UP (ref 10.3–14.5)
SARS-COV-2 RNA SPEC QL NAA+PROBE: SIGNIFICANT CHANGE UP
SODIUM SERPL-SCNC: 138 MMOL/L — SIGNIFICANT CHANGE UP (ref 135–145)
TROPONIN I, HIGH SENSITIVITY RESULT: 4.1 NG/L — SIGNIFICANT CHANGE UP
TROPONIN I, HIGH SENSITIVITY RESULT: 4.9 NG/L — SIGNIFICANT CHANGE UP
WBC # BLD: 5.7 K/UL — SIGNIFICANT CHANGE UP (ref 3.8–10.5)
WBC # FLD AUTO: 5.7 K/UL — SIGNIFICANT CHANGE UP (ref 3.8–10.5)

## 2022-05-31 PROCEDURE — 99285 EMERGENCY DEPT VISIT HI MDM: CPT

## 2022-05-31 PROCEDURE — 99222 1ST HOSP IP/OBS MODERATE 55: CPT

## 2022-05-31 PROCEDURE — 93010 ELECTROCARDIOGRAM REPORT: CPT

## 2022-05-31 PROCEDURE — 74174 CTA ABD&PLVS W/CONTRAST: CPT | Mod: 26,MA

## 2022-05-31 PROCEDURE — 71045 X-RAY EXAM CHEST 1 VIEW: CPT | Mod: 26

## 2022-05-31 PROCEDURE — 71275 CT ANGIOGRAPHY CHEST: CPT | Mod: 26,MA

## 2022-05-31 RX ORDER — METOPROLOL TARTRATE 50 MG
25 TABLET ORAL
Refills: 0 | Status: DISCONTINUED | OUTPATIENT
Start: 2022-05-31 | End: 2022-05-31

## 2022-05-31 RX ORDER — LANOLIN ALCOHOL/MO/W.PET/CERES
3 CREAM (GRAM) TOPICAL AT BEDTIME
Refills: 0 | Status: DISCONTINUED | OUTPATIENT
Start: 2022-05-31 | End: 2022-06-02

## 2022-05-31 RX ORDER — ATORVASTATIN CALCIUM 80 MG/1
40 TABLET, FILM COATED ORAL AT BEDTIME
Refills: 0 | Status: DISCONTINUED | OUTPATIENT
Start: 2022-05-31 | End: 2022-06-02

## 2022-05-31 RX ORDER — AMLODIPINE BESYLATE 2.5 MG/1
2.5 TABLET ORAL DAILY
Refills: 0 | Status: DISCONTINUED | OUTPATIENT
Start: 2022-05-31 | End: 2022-06-02

## 2022-05-31 RX ORDER — NITROGLYCERIN 6.5 MG
0.4 CAPSULE, EXTENDED RELEASE ORAL ONCE
Refills: 0 | Status: COMPLETED | OUTPATIENT
Start: 2022-05-31 | End: 2022-05-31

## 2022-05-31 RX ORDER — NITROGLYCERIN 6.5 MG
0.4 CAPSULE, EXTENDED RELEASE ORAL
Refills: 0 | Status: DISCONTINUED | OUTPATIENT
Start: 2022-05-31 | End: 2022-06-02

## 2022-05-31 RX ORDER — ACETAMINOPHEN 500 MG
650 TABLET ORAL EVERY 6 HOURS
Refills: 0 | Status: DISCONTINUED | OUTPATIENT
Start: 2022-05-31 | End: 2022-06-02

## 2022-05-31 RX ORDER — INFLUENZA VIRUS VACCINE 15; 15; 15; 15 UG/.5ML; UG/.5ML; UG/.5ML; UG/.5ML
0.7 SUSPENSION INTRAMUSCULAR ONCE
Refills: 0 | Status: DISCONTINUED | OUTPATIENT
Start: 2022-05-31 | End: 2022-06-02

## 2022-05-31 RX ORDER — ASPIRIN/CALCIUM CARB/MAGNESIUM 324 MG
81 TABLET ORAL DAILY
Refills: 0 | Status: DISCONTINUED | OUTPATIENT
Start: 2022-05-31 | End: 2022-06-02

## 2022-05-31 RX ORDER — PANTOPRAZOLE SODIUM 20 MG/1
40 TABLET, DELAYED RELEASE ORAL ONCE
Refills: 0 | Status: COMPLETED | OUTPATIENT
Start: 2022-05-31 | End: 2022-05-31

## 2022-05-31 RX ADMIN — Medication 0.4 MILLIGRAM(S): at 09:04

## 2022-05-31 RX ADMIN — Medication 0.4 MILLIGRAM(S): at 07:45

## 2022-05-31 RX ADMIN — PANTOPRAZOLE SODIUM 40 MILLIGRAM(S): 20 TABLET, DELAYED RELEASE ORAL at 08:14

## 2022-05-31 RX ADMIN — ATORVASTATIN CALCIUM 40 MILLIGRAM(S): 80 TABLET, FILM COATED ORAL at 21:57

## 2022-05-31 RX ADMIN — Medication 81 MILLIGRAM(S): at 14:38

## 2022-05-31 NOTE — PATIENT PROFILE ADULT - FUNCTIONAL SCREEN CURRENT LEVEL: DRESSING, MLM
0 = independent
No
Stay hydrated and eat well balanced meals.    Follow up with Dr. Faust and continue to check your glucose daily.    Return to ED with worsening symptoms or other concerns.        Dizziness      Dizziness is a common problem. It is a feeling of unsteadiness or light-headedness. You may feel like you are about to faint. Dizziness can lead to injury if you stumble or fall. Anyone can become dizzy, but dizziness is more common in older adults. This condition can be caused by a number of things, including medicines, dehydration, or illness.      Follow these instructions at home:      Eating and drinking   A comparison of three sample cups showing dark yellow, yellow, and pale yellow urine.   •Drink enough fluid to keep your urine pale yellow. This helps to keep you from becoming dehydrated. Try to drink more clear fluids, such as water.      • Do not drink alcohol.      •Limit your caffeine intake if told to do so by your health care provider. Check ingredients and nutrition facts to see if a food or beverage contains caffeine.      •Limit your salt (sodium) intake if told to do so by your health care provider. Check ingredients and nutrition facts to see if a food or beverage contains sodium.        Activity   A sign showing that a person should not drive. •Avoid making quick movements.  •Rise slowly from chairs and steady yourself until you feel okay.      •In the morning, first sit up on the side of the bed. When you feel okay, stand slowly while you hold onto something until you know that your balance is good.        •If you need to  one place for a long time, move your legs often. Tighten and relax the muscles in your legs while you are standing.      • Do not drive or use machinery if you feel dizzy.      •Avoid bending down if you feel dizzy. Place items in your home so that they are easy for you to reach without leaning over.      Lifestyle     • Do not use any products that contain nicotine or tobacco. These products include cigarettes, chewing tobacco, and vaping devices, such as e-cigarettes. If you need help quitting, ask your health care provider.      •Try to reduce your stress level by using methods such as yoga or meditation. Talk with your health care provider if you need help to manage your stress.      General instructions     •Watch your dizziness for any changes.      •Take over-the-counter and prescription medicines only as told by your health care provider. Talk with your health care provider if you think that your dizziness is caused by a medicine that you are taking.      •Tell a friend or a family member that you are feeling dizzy. If he or she notices any changes in your behavior, have this person call your health care provider.      •Keep all follow-up visits. This is important.        Contact a health care provider if:    •Your dizziness does not go away or you have new symptoms.      •Your dizziness or light-headedness gets worse.      •You feel nauseous.      •You have reduced hearing.      •You have a fever.      •You have neck pain or a stiff neck.      •Your dizziness leads to an injury or a fall.        Get help right away if:    •You vomit or have diarrhea and are unable to eat or drink anything.      •You have problems talking, walking, swallowing, or using your arms, hands, or legs.      •You feel generally weak.      •You have any bleeding.      •You are not thinking clearly or you have trouble forming sentences. It may take a friend or family member to notice this.      •You have chest pain, abdominal pain, shortness of breath, or sweating.      •Your vision changes or you develop a severe headache.      These symptoms may represent a serious problem that is an emergency. Do not wait to see if the symptoms will go away. Get medical help right away. Call your local emergency services (911 in the [place] Do not drive yourself to the hospital.       Summary    •Dizziness is a feeling of unsteadiness or light-headedness. This condition can be caused by a number of things, including medicines, dehydration, or illness.      •Anyone can become dizzy, but dizziness is more common in older adults.      •Drink enough fluid to keep your urine pale yellow. Do not drink alcohol.      •Avoid making quick movements if you feel dizzy. Monitor your dizziness for any changes.      This information is not intended to replace advice given to you by your health care provider. Make sure you discuss any questions you have with your health care provider.      Document Revised: 11/22/2021 Document Reviewed: 11/22/2021    Elsevier Patient Education © 2022 [employer]

## 2022-05-31 NOTE — ED ADULT NURSE NOTE - ED STAT RN HANDOFF DETAILS
Report given to hold RICKI Patterson. Pt axo4, no pending labs or medication. IV Intact, Sinus Diaz on monitor.

## 2022-05-31 NOTE — H&P ADULT - PROBLEM SELECTOR PLAN 3
Not taking BP med for 1 year  Unable to start BB due to sinus devonte  Start low dose amlodipine  Monitor BP and titrate BP med

## 2022-05-31 NOTE — ED ADULT TRIAGE NOTE - CHIEF COMPLAINT QUOTE
----- Message from Teresia Alpers sent at 5/8/2018  8:09 AM EDT -----  Regarding: SAKINA Chand/Francisca Elkins Hint, spouse it requesting a Rx to help with the pt Gout.  (Foot is unknown) [phone] Pt complains of non radiating chest pain x 12 mid-night

## 2022-05-31 NOTE — ED PROVIDER NOTE - OBJECTIVE STATEMENT
71 year old male with h/o pre diabetes presents today c/o chest pain since midnight, pt describes a constant and nonradiating pain present in his left chest rated 7/10, pt initially thought it was due to gas, however when he burped he had no relief, pt was able to sleep but woke up this morning with the discomfort still in his chest, pt attempted to drink tea and vomited twice which prompted him to come in for evaluation (-) sob (-) diaphoresis (-) dizzy or lightheaded (-) headache +upper abdominal tenderness (-) rectal bleeding (-) recent travel (-) leg edema or calf pain

## 2022-05-31 NOTE — H&P ADULT - PROBLEM SELECTOR PLAN 1
present with chest pain, which started at midnight, lasted for around 9 hours, relieved with nitroglycerin  H/o HTN, HLD, but not taking med for 1 years  KG with sinus devonte, VR 53, QTc 377, PAC+  hsTnT 4.1, CKMB <1  Serial trop/CK/CKMB  aspirin, statin  Unable to start BB due to sinus devonte  NST in AM  NPO after midnight  Lipid panel   SL nitro prn

## 2022-05-31 NOTE — ED ADULT TRIAGE NOTE - TEMPERATURE IN CELSIUS (DEGREES C)
4/4/19 - spoke to patient regarding no INR results since December 2018. He states he has been going to the lab and has had a couple INRs since then. Spoke to staff at [facility] who states they do have a result from March that they will fax over (INR 2.2 3/13/19). They told me these results should be coming to us automatically and she confirmed our fax number to be sure they have it correct in the system. A new standing order with our clinic contact info resent and receipt confirmed. Patient updated on new orders sent and advised to go to lab for INR 4/8. He was also advised that if he does not hear from us by afternoon day after lab visit he should contact us.      
I called pt left message and I called the lab pt hasn't been there since March.   
Pt missed the 3 mg twice this week.   
36.4

## 2022-05-31 NOTE — H&P ADULT - HISTORY OF PRESENT ILLNESS
71 years old male with h/o HTN, HLD, prediabetes present to ED with complain of chest pain. Patient reported chest pain started at midnight, located on left chest, heavy pain, 9/10 intensity, lasted for around 8-9 hours until arrival in ED. Chest pain is improved with nitroglycerine patient received in ED. No diaphoresis, SOB or LOC. Normally very active with no exertional chest pain   Hypertensive upon arrival, improved with nitroglycerine, sat well at RA. EKG with sinus devonte, VR 53, QTc 377, PAC+. No leukocytosis, plt 170, K 4.5, Cr 0.87, glucose 263, hsTnT 4.1, CKMD <1. CTA with no aortic aneurysm or dissection.     SH: former smoker ( stopped 10 years ago)  FH: Mother-DM

## 2022-05-31 NOTE — ED ADULT NURSE NOTE - OBJECTIVE STATEMENT
Pt AAOx3. Presents to ED with non-radiating left chest pain that began around midnight.  Pt states he drank tea this morning and vomited. Pt states he has had this type of pain before but it was gas, this time he states he tried to burp but had no relief. Slight difficulty breathing per pt. Denies diaphoresis, constipation, dizziness, weakness, calf pain, back pain, headache, fever, chills, cough, hematuria, dysuria, hematochezia, dyschezia.

## 2022-05-31 NOTE — H&P ADULT - NSHPPHYSICALEXAM_GEN_ALL_CORE
CONSTITUTIONAL: Well developed, well nourished, alert and cooperative, no acute distress  EYES: PERRL, no scleral icterus  ENT: Mucosa moist, tongue normal.   NECK: Neck supple, trachea midline, non-tender  CARDIAC: Normal S1 and S2. Regular rate and rhythms. No murmurs.  No Pedal edema.   LUNGS: Clear to auscultation, equal air entry both lungs. No rales, rhonchi, wheezing. Normal respiratory effort.   ABDOMEN: Soft, nondistended, nontender. No guarding or rebound tenderness. No hepatomegaly or splenomegaly. Bowel sound normal.  MUSCULOSKELETAL: Normocephalic, atraumatic. No significant deformity or joint abnormality.  NEUROLOGICAL: No gross motor or sensory deficits.   SKIN: no lesions or eruptions. Normal turgor  PSYCHIATRIC: A&O x 3, appropriate mood and affect

## 2022-05-31 NOTE — PATIENT PROFILE ADULT - FALL HARM RISK - UNIVERSAL INTERVENTIONS
Bed in lowest position, wheels locked, appropriate side rails in place/Call bell, personal items and telephone in reach/Instruct patient to call for assistance before getting out of bed or chair/Non-slip footwear when patient is out of bed/[place] to call system/Physically safe environment - no spills, clutter or unnecessary equipment/Purposeful Proactive Rounding/Room/bathroom lighting operational, light cord in reach

## 2022-05-31 NOTE — H&P ADULT - ASSESSMENT
71 years old male with h/o HTN, HLD, prediabetes present to ED with complain of chest pain. Patient reported chest pain started at midnight, located on left chest, heavy pain, 9/10 intensity, lasted for around 8-9 hours until arrival in ED. Chest pain is improved with nitroglycerine patient received in ED. No diaphoresis, SOB or LOC. Normally very active with no exertional chest pain   Hypertensive upon arrival, improved with nitroglycerine, sat well at RA. EKG with sinus devonte, VR 53, QTc 377, PAC+. No leukocytosis, plt 170, K 4.5, Cr 0.87, glucose 263, hsTnT 4.1, CKMD <1. CTA with no aortic aneurysm or dissection.     Admitted with chest pain

## 2022-06-01 LAB
A1C WITH ESTIMATED AVERAGE GLUCOSE RESULT: 10.3 % — HIGH (ref 4–5.6)
ALBUMIN SERPL ELPH-MCNC: 3.1 G/DL — LOW (ref 3.3–5)
ALP SERPL-CCNC: 80 U/L — SIGNIFICANT CHANGE UP (ref 40–120)
ALT FLD-CCNC: 11 U/L — LOW (ref 12–78)
ANION GAP SERPL CALC-SCNC: 9 MMOL/L — SIGNIFICANT CHANGE UP (ref 5–17)
AST SERPL-CCNC: 10 U/L — LOW (ref 15–37)
BILIRUB SERPL-MCNC: 0.5 MG/DL — SIGNIFICANT CHANGE UP (ref 0.2–1.2)
BUN SERPL-MCNC: 11 MG/DL — SIGNIFICANT CHANGE UP (ref 7–23)
CALCIUM SERPL-MCNC: 8.2 MG/DL — LOW (ref 8.5–10.1)
CHLORIDE SERPL-SCNC: 105 MMOL/L — SIGNIFICANT CHANGE UP (ref 96–108)
CHOLEST SERPL-MCNC: 174 MG/DL — SIGNIFICANT CHANGE UP
CK MB BLD-MCNC: <3 % — SIGNIFICANT CHANGE UP (ref 0–3.5)
CK MB CFR SERPL CALC: <1 NG/ML — SIGNIFICANT CHANGE UP (ref 0.5–3.6)
CK SERPL-CCNC: 33 U/L — SIGNIFICANT CHANGE UP (ref 26–308)
CO2 SERPL-SCNC: 25 MMOL/L — SIGNIFICANT CHANGE UP (ref 22–31)
CREAT SERPL-MCNC: 1 MG/DL — SIGNIFICANT CHANGE UP (ref 0.5–1.3)
EGFR: 80 ML/MIN/1.73M2 — SIGNIFICANT CHANGE UP
ESTIMATED AVERAGE GLUCOSE: 249 MG/DL — HIGH (ref 68–114)
GLUCOSE BLDC GLUCOMTR-MCNC: 268 MG/DL — HIGH (ref 70–99)
GLUCOSE SERPL-MCNC: 277 MG/DL — HIGH (ref 70–99)
HCT VFR BLD CALC: 41.1 % — SIGNIFICANT CHANGE UP (ref 39–50)
HDLC SERPL-MCNC: 38 MG/DL — LOW
HGB BLD-MCNC: 13.4 G/DL — SIGNIFICANT CHANGE UP (ref 13–17)
LIPID PNL WITH DIRECT LDL SERPL: 117 MG/DL — HIGH
MAGNESIUM SERPL-MCNC: 2.4 MG/DL — SIGNIFICANT CHANGE UP (ref 1.6–2.6)
MCHC RBC-ENTMCNC: 27.2 PG — SIGNIFICANT CHANGE UP (ref 27–34)
MCHC RBC-ENTMCNC: 32.6 G/DL — SIGNIFICANT CHANGE UP (ref 32–36)
MCV RBC AUTO: 83.4 FL — SIGNIFICANT CHANGE UP (ref 80–100)
NON HDL CHOLESTEROL: 136 MG/DL — HIGH
NRBC # BLD: 0 /100 WBCS — SIGNIFICANT CHANGE UP (ref 0–0)
PHOSPHATE SERPL-MCNC: 2.6 MG/DL — SIGNIFICANT CHANGE UP (ref 2.5–4.5)
PLATELET # BLD AUTO: 152 K/UL — SIGNIFICANT CHANGE UP (ref 150–400)
POTASSIUM SERPL-MCNC: 4.2 MMOL/L — SIGNIFICANT CHANGE UP (ref 3.5–5.3)
POTASSIUM SERPL-SCNC: 4.2 MMOL/L — SIGNIFICANT CHANGE UP (ref 3.5–5.3)
PROT SERPL-MCNC: 6.4 GM/DL — SIGNIFICANT CHANGE UP (ref 6–8.3)
RBC # BLD: 4.93 M/UL — SIGNIFICANT CHANGE UP (ref 4.2–5.8)
RBC # FLD: 13.2 % — SIGNIFICANT CHANGE UP (ref 10.3–14.5)
SODIUM SERPL-SCNC: 139 MMOL/L — SIGNIFICANT CHANGE UP (ref 135–145)
TRIGL SERPL-MCNC: 97 MG/DL — SIGNIFICANT CHANGE UP
TROPONIN I, HIGH SENSITIVITY RESULT: 4.8 NG/L — SIGNIFICANT CHANGE UP
WBC # BLD: 5.72 K/UL — SIGNIFICANT CHANGE UP (ref 3.8–10.5)
WBC # FLD AUTO: 5.72 K/UL — SIGNIFICANT CHANGE UP (ref 3.8–10.5)

## 2022-06-01 PROCEDURE — 78452 HT MUSCLE IMAGE SPECT MULT: CPT | Mod: 26

## 2022-06-01 PROCEDURE — 99233 SBSQ HOSP IP/OBS HIGH 50: CPT

## 2022-06-01 RX ORDER — INSULIN LISPRO 100/ML
3 VIAL (ML) SUBCUTANEOUS
Refills: 0 | Status: DISCONTINUED | OUTPATIENT
Start: 2022-06-01 | End: 2022-06-02

## 2022-06-01 RX ORDER — DEXTROSE 50 % IN WATER 50 %
12.5 SYRINGE (ML) INTRAVENOUS ONCE
Refills: 0 | Status: DISCONTINUED | OUTPATIENT
Start: 2022-06-01 | End: 2022-06-02

## 2022-06-01 RX ORDER — DEXTROSE 50 % IN WATER 50 %
25 SYRINGE (ML) INTRAVENOUS ONCE
Refills: 0 | Status: DISCONTINUED | OUTPATIENT
Start: 2022-06-01 | End: 2022-06-02

## 2022-06-01 RX ORDER — REGADENOSON 0.08 MG/ML
0.4 INJECTION, SOLUTION INTRAVENOUS ONCE
Refills: 0 | Status: COMPLETED | OUTPATIENT
Start: 2022-06-01 | End: 2022-06-01

## 2022-06-01 RX ORDER — DEXTROSE 50 % IN WATER 50 %
15 SYRINGE (ML) INTRAVENOUS ONCE
Refills: 0 | Status: DISCONTINUED | OUTPATIENT
Start: 2022-06-01 | End: 2022-06-02

## 2022-06-01 RX ORDER — GLUCAGON INJECTION, SOLUTION 0.5 MG/.1ML
1 INJECTION, SOLUTION SUBCUTANEOUS ONCE
Refills: 0 | Status: DISCONTINUED | OUTPATIENT
Start: 2022-06-01 | End: 2022-06-02

## 2022-06-01 RX ORDER — INSULIN GLARGINE 100 [IU]/ML
10 INJECTION, SOLUTION SUBCUTANEOUS AT BEDTIME
Refills: 0 | Status: DISCONTINUED | OUTPATIENT
Start: 2022-06-01 | End: 2022-06-02

## 2022-06-01 RX ADMIN — AMLODIPINE BESYLATE 2.5 MILLIGRAM(S): 2.5 TABLET ORAL at 05:42

## 2022-06-01 RX ADMIN — ATORVASTATIN CALCIUM 40 MILLIGRAM(S): 80 TABLET, FILM COATED ORAL at 21:26

## 2022-06-01 RX ADMIN — INSULIN GLARGINE 10 UNIT(S): 100 INJECTION, SOLUTION SUBCUTANEOUS at 21:27

## 2022-06-01 RX ADMIN — REGADENOSON 0.4 MILLIGRAM(S): 0.08 INJECTION, SOLUTION INTRAVENOUS at 09:52

## 2022-06-01 NOTE — CONSULT NOTE ADULT - SUBJECTIVE AND OBJECTIVE BOX
Patient is a 71y old  Male who presents with a chief complaint of chest pain (01 Jun 2022 14:15)      Reason For Consult: diabetes uncontrolled     HPI:  71 years old male with h/o HTN, HLD, prediabetes present to ED with complain of chest pain. Patient reported chest pain started at midnight, located on left chest, heavy pain, 9/10 intensity, lasted for around 8-9 hours until arrival in ED. Chest pain is improved with nitroglycerine patient received in ED. No diaphoresis, SOB or LOC. Normally very active with no exertional chest pain   Hypertensive upon arrival, improved with nitroglycerine, sat well at RA. EKG with sinus devonte, VR 53, QTc 377, PAC+. No leukocytosis, plt 170, K 4.5, Cr 0.87, glucose 263, hsTnT 4.1, CKMD <1. CTA with no aortic aneurysm or dissection.   HbA1C 10.3 and Creatinine 1.00 , finger sticks are in 250 range and not on any diabetic medications inpatient   SH: former smoker ( stopped 10 years ago)  FH: Mother-DM (31 May 2022 15:23)      PAST MEDICAL & SURGICAL HISTORY:  Pre-diabetes      H/O ventral hernia repair      H/O inguinal hernia repair          FAMILY HISTORY:  FH: type 2 diabetes    FH: prostate cancer          Social History:    MEDICATIONS  (STANDING):  amLODIPine   Tablet 2.5 milliGRAM(s) Oral daily  aspirin  chewable 81 milliGRAM(s) Oral daily  atorvastatin 40 milliGRAM(s) Oral at bedtime  dextrose 50% Injectable 25 Gram(s) IV Push once  dextrose 50% Injectable 12.5 Gram(s) IV Push once  dextrose 50% Injectable 25 Gram(s) IV Push once  dextrose Oral Gel 15 Gram(s) Oral once  glucagon  Injectable 1 milliGRAM(s) IntraMuscular once  influenza  Vaccine (HIGH DOSE) 0.7 milliLiter(s) IntraMuscular once  insulin glargine Injectable (LANTUS) 10 Unit(s) SubCutaneous at bedtime  insulin lispro Injectable (ADMELOG) 3 Unit(s) SubCutaneous three times a day before meals    MEDICATIONS  (PRN):  acetaminophen     Tablet .. 650 milliGRAM(s) Oral every 6 hours PRN Mild Pain (1 - 3), Moderate Pain (4 - 6)  melatonin 3 milliGRAM(s) Oral at bedtime PRN Insomnia  nitroglycerin     SubLingual 0.4 milliGRAM(s) SubLingual every 5 minutes PRN Chest Pain      REVIEW OF SYSTEMS:  CONSTITUTIONAL:  as per HPI  HEENT:  Eyes:  No diplopia or blurred vision.   ENT:  No earache, sore throat or runny nose.  CARDIOVASCULAR:  No chest pain .  RESPIRATORY:  No cough, shortness of breath, PND or orthopnea.  GASTROINTESTINAL:  No nausea, vomiting or diarrhea.  GENITOURINARY:  No dysuria, frequency or urgency. No Blood in urine  MUSCULOSKELETAL:  no joint aches, no muscle pain, myalgia  SKIN:  No change in skin, hair or nails.  NEUROLOGIC:  No paresthesias, fasciculations, seizures or weakness.  PSYCHIATRIC:  No disorder of thought or mood.  ENDOCRINE:  No heat or cold intolerance, polyuria or polydipsia. abnormal weight gain or loss, oral thrush  HEMATOLOGICAL:  No easy bruising or bleeding.     T(C): 36.7 (06-01-22 @ 16:09), Max: 37 (06-01-22 @ 04:59)  HR: 17 (06-01-22 @ 16:09) (17 - 67)  BP: 122/75 (06-01-22 @ 16:09) (122/75 - 158/84)  RR: 18 (06-01-22 @ 16:09) (16 - 18)  SpO2: 96% (06-01-22 @ 11:48) (96% - 99%)  Wt(kg): --    PHYSICAL EXAM:  GENERAL: NAD, well-groomed, well-developed  HEAD:  Atraumatic, Normocephalic  EYES: PERRLA, conjunctiva and sclera clear  ENMT: No  exudates,, Moist mucous membranes,, No lesions  NECK: Supple, No JVD,   NERVOUS SYSTEM:  Alert & Oriented   CHEST/LUNG: Clear to percussion bilaterally; No rales, rhonchi, wheezing, or rubs  HEART: Regular rate and rhythm; No murmurs, rubs, or gallops  ABDOMEN: Soft, Nontender, Nondistended; Bowel sounds present  EXTREMITIES:  2+ Peripheral Pulses, No clubbing, cyanosis, or edema  LYMPH: No lymphadenopathy noted  SKIN: No rashes or lesions    CAPILLARY BLOOD GLUCOSE                                13.4   5.72  )-----------( 152      ( 01 Jun 2022 06:53 )             41.1       CMP:  06-01 @ 06:53  SGPT 11  Albumin 3.1   Alk Phos 80   Anion Gap 9   SGOT 10   Total Bili 0.5   BUN 11   Calcium Total 8.2   CO2 25   Chloride 105   Creatinine 1.00   eGFR if AA --   eGFR if non AA --   Glucose 277   Potassium 4.2   Protein 6.4   Sodium 139      Thyroid Function Tests:      Diabetes Tests:     Parathyroids:     Adrenals:       Radiology:

## 2022-06-01 NOTE — PROGRESS NOTE ADULT - SUBJECTIVE AND OBJECTIVE BOX
Patient is a 71y old  Male who presents with a chief complaint of chest pain (31 May 2022 15:23)      OVERNIGHT EVENTS:    admitted overnight with chest pain      MEDICATIONS  (STANDING):  amLODIPine   Tablet 2.5 milliGRAM(s) Oral daily  aspirin  chewable 81 milliGRAM(s) Oral daily  atorvastatin 40 milliGRAM(s) Oral at bedtime  influenza  Vaccine (HIGH DOSE) 0.7 milliLiter(s) IntraMuscular once    MEDICATIONS  (PRN):  acetaminophen     Tablet .. 650 milliGRAM(s) Oral every 6 hours PRN Mild Pain (1 - 3), Moderate Pain (4 - 6)  melatonin 3 milliGRAM(s) Oral at bedtime PRN Insomnia  nitroglycerin     SubLingual 0.4 milliGRAM(s) SubLingual every 5 minutes PRN Chest Pain      Allergies    No Known Allergies    Intolerances        SUBJECTIVE: in bed in NAD, no acute events overnight     T(F): 97.6 (06-01-22 @ 11:48), Max: 98.6 (06-01-22 @ 04:59)  HR: 60 (06-01-22 @ 11:48) (53 - 67)  BP: 128/71 (06-01-22 @ 11:48) (128/71 - 158/84)  RR: 16 (06-01-22 @ 11:48) (16 - 20)  SpO2: 96% (06-01-22 @ 11:48) (96% - 99%)  Wt(kg): --    PHYSICAL EXAM:  GENERAL: NAD, well-groomed, well-developed  HEAD:  Atraumatic, Normocephalic  EYES: EOMI, PERRLA, conjunctiva and sclera clear  ENMT: No tonsillar erythema, exudates, or enlargement; Moist mucous membranes, Good dentition, No lesions  NECK: Supple,   CHEST/LUNG: Clear to  auscultation bilaterally; No rales, rhonchi, wheezing, or rubs  bilaterally  HEART: Regular rate and rhythm; No murmurs, rubs, or gallops  ABDOMEN: Soft, Nontender, Nondistended; Bowel sounds present  EXTREMITIES:  2+ Peripheral Pulses, No clubbing, cyanosis, or edema BL LE  SKIN: No rashes or lesions  NERVOUS SYSTEM:  Alert & Oriented X3, Good concentration; Motor Strength 5/5 B/L upper and lower extremities;   DTRs 2+ intact and symmetric, sensation intact BL    LABS:                        13.4   5.72  )-----------( 152      ( 01 Jun 2022 06:53 )             41.1     06-01    139  |  105  |  11  ----------------------------<  277<H>  4.2   |  25  |  1.00    Ca    8.2<L>      01 Jun 2022 06:53  Phos  2.6     06-01  Mg     2.4     06-01    TPro  6.4  /  Alb  3.1<L>  /  TBili  0.5  /  DBili  x   /  AST  10<L>  /  ALT  11<L>  /  AlkPhos  80  06-01    PT/INR - ( 31 May 2022 07:55 )   PT: 12.2 sec;   INR: 1.02 ratio         PTT - ( 31 May 2022 07:55 )  PTT:29.7 sec    Cultures;   CAPILLARY BLOOD GLUCOSE        Lipid panel:   LDL --  TG 97    CARDIAC MARKERS ( 01 Jun 2022 06:53 )  x     / x     / 33 U/L / x     / <1.0 ng/mL  CARDIAC MARKERS ( 31 May 2022 18:29 )  x     / x     / 40 U/L / x     / <1.0 ng/mL  CARDIAC MARKERS ( 31 May 2022 07:55 )  x     / x     / 54 U/L / x     / <1.0 ng/mL    Lipid Profile (06.01.22 @ 08:48)    Cholesterol, Serum: 174 mg/dL    Triglycerides, Serum: 97 mg/dL    HDL Cholesterol, Serum: 38 mg/dL    Non HDL Cholesterol: 136: Patients Atherosclerotic Cardiovascular Disease (ASCVD) Risk  Optimal Level (mg/dL)  LDL Cholesterol (LDL-C)  All Patients                                < 100  ASCVD at Very High Risk1    < 70  Non-HDL Cholesterol (Non-HDL-C)  All Patients                       < 130  ASCVD at Very High Risk1   < 100  Non-HDL-Cholesterol (Non-HDL-C) is also a key target for cardiovascular  risk reduction.  Consider Familial Hypercholesterolemia when: LDL-C > 190 mg/dL or  Non-HDL-C > 220 mg/dL.  LDL-C calculation using the Friedewald equation is not provided when  triglycerides > 400 mg/dL, in which case we recommend repeating the test  after fasting, if it was not done before.  When triglycerides >150 mg/dL, calculated LDL-C is provided but maystill  be inaccurate (particularly when LDL-C < 70 mg/dL). It can be  recalculated off-line using other equations (e.g. Amari SS, Kristina MJ,  Denilson PATRICIA, et al.JANETH 2013;310:2061 - 8).  1 Bartolome Judd,et al. "2019 AHA/ACC. . . guideline on the  management of blood cholesterol: a report of the American College of  Cardiology/American Heart Association Task Force on Clinical Practice  Guidelines." Circulation;139:e1082 - e1143.  These values apply only to persons 20 years and older.  Lipid Panel updated with new test, reference ranges and interpretive  comments effective 10-. mg/dL    LDL Cholesterol Calculated: 117 mg/dL        RADIOLOGY & ADDITIONAL TESTS:      Imaging Personally Reviewed:  [ x] YES      Consultant(s) Notes Reviewed:  [x ] YES     Care Discussed with [x ] Consultants [X ] Patient [x ] Family  [x ]    [x ]  Other; RN

## 2022-06-01 NOTE — CONSULT NOTE ADULT - PROBLEM SELECTOR RECOMMENDATION 9
start on Lantus 10 units and 3 units prandial lispro   may need more Lantus and prandial lispro   while inpatient, finger sticks should be 100-180   will need Lantus upon discharge as HbA1C is increased   Thank You for the courtesy of this consultation !!!

## 2022-06-01 NOTE — PROGRESS NOTE ADULT - PROBLEM SELECTOR PLAN 2
per my colleague's documentation    "BMP glucose is elevated at 263  H/o prediabetes  Check A1c"  6/1/2022 a1c at 10.3  will consult endocrine- presumed new onset

## 2022-06-02 ENCOUNTER — TRANSCRIPTION ENCOUNTER (OUTPATIENT)
Age: 72
End: 2022-06-02

## 2022-06-02 VITALS
DIASTOLIC BLOOD PRESSURE: 77 MMHG | OXYGEN SATURATION: 95 % | HEART RATE: 65 BPM | SYSTOLIC BLOOD PRESSURE: 138 MMHG | TEMPERATURE: 98 F | RESPIRATION RATE: 18 BRPM

## 2022-06-02 LAB
GLUCOSE BLDC GLUCOMTR-MCNC: 162 MG/DL — HIGH (ref 70–99)
GLUCOSE BLDC GLUCOMTR-MCNC: 228 MG/DL — HIGH (ref 70–99)
GLUCOSE BLDC GLUCOMTR-MCNC: 260 MG/DL — HIGH (ref 70–99)

## 2022-06-02 PROCEDURE — 99239 HOSP IP/OBS DSCHRG MGMT >30: CPT

## 2022-06-02 PROCEDURE — 93306 TTE W/DOPPLER COMPLETE: CPT | Mod: 26

## 2022-06-02 RX ORDER — ASPIRIN/CALCIUM CARB/MAGNESIUM 324 MG
1 TABLET ORAL
Qty: 30 | Refills: 0
Start: 2022-06-02

## 2022-06-02 RX ORDER — ATORVASTATIN CALCIUM 80 MG/1
1 TABLET, FILM COATED ORAL
Qty: 30 | Refills: 0
Start: 2022-06-02

## 2022-06-02 RX ORDER — AMLODIPINE BESYLATE 2.5 MG/1
1 TABLET ORAL
Qty: 30 | Refills: 0
Start: 2022-06-02

## 2022-06-02 RX ORDER — ISOPROPYL ALCOHOL, BENZOCAINE .7; .06 ML/ML; ML/ML
1 SWAB TOPICAL
Qty: 100 | Refills: 1
Start: 2022-06-02 | End: 2022-07-21

## 2022-06-02 RX ORDER — METFORMIN HYDROCHLORIDE 850 MG/1
1 TABLET ORAL
Qty: 60 | Refills: 0
Start: 2022-06-02 | End: 2022-07-01

## 2022-06-02 RX ADMIN — Medication 3 UNIT(S): at 08:24

## 2022-06-02 RX ADMIN — Medication 3 UNIT(S): at 17:17

## 2022-06-02 RX ADMIN — Medication 81 MILLIGRAM(S): at 12:01

## 2022-06-02 RX ADMIN — AMLODIPINE BESYLATE 2.5 MILLIGRAM(S): 2.5 TABLET ORAL at 05:26

## 2022-06-02 RX ADMIN — Medication 3 UNIT(S): at 12:02

## 2022-06-02 NOTE — DISCHARGE NOTE PROVIDER - NSDCMRMEDTOKEN_GEN_ALL_CORE_FT
alcohol swabs : Apply topically to affected area 4 times a day   amLODIPine 2.5 mg oral tablet: 1 tab(s) orally once a day  aspirin 81 mg oral tablet, chewable: 1 tab(s) orally once a day  atorvastatin 40 mg oral tablet: 1 tab(s) orally once a day (at bedtime)  glucometer (per patient&#x27;s insurance): Test blood sugars four times a day. Dispense #1 glucometer.  lancets: 1 application subcutaneously 4 times a day   metFORMIN 1000 mg oral tablet: 1 tab(s) orally 2 times a day   test strips (per patient&#x27;s insurance): 1 application subcutaneously 4 times a day. ** Compatible with patient&#x27;s glucometer **

## 2022-06-02 NOTE — PROGRESS NOTE ADULT - PROBLEM SELECTOR PLAN 1
better with decreasing glucose toxicity and hyperglycemia   Continue with the current  regimen while inpatient   can be discharged on current dose/ regimen
present with chest pain, which started at midnight, lasted for around 9 hours, relieved with nitroglycerin  H/o HTN, HLD, but not taking med for 1 years  KG with sinus devonte, VR 53, QTc 377, PAC+  hsTnT 4.1, CKMB <1  per my colleague's documentation  "Serial trop/CK/CKMB  aspirin, statin  Unable to start BB due to sinus devonte  NST in AM  NPO after midnight  Lipid panel   SL nitro prn"    6/1/2022 f/u stress test results, troponin negative times 3 ..\   f/u echo

## 2022-06-02 NOTE — DIETITIAN INITIAL EVALUATION ADULT - OTHER CALCULATIONS
Wt: 83.2 kg (6/2), IBW: , UBW: , %IBW: , %UBW:  Wt: 83.2 kg (6/2), IBW: 75.2 kg +/- 10%, UBW: stable, %IBW: 110.6%, %UBW: stable

## 2022-06-02 NOTE — DIETITIAN INITIAL EVALUATION ADULT - NUTRITIONGOAL OUTCOME1
Improved glycemic control (140-180 mg/dL); Pt to verbalize 3 CHO foods c their recommended portion sizes; Improved diet comprehension/compliance

## 2022-06-02 NOTE — DIETITIAN INITIAL EVALUATION ADULT - OTHER INFO
Pt independent for ADLs; does his own food shopping/cooking. Pt with h/o prediabetes; newly diagnosed with DM (HgbA1c=10.3%). Pt's mother has DM, so pt,   Provided pt with DM nutrition counseling/education materials; discussed CHO foods & beverages with their recommended portion sizes; discussed plate method; discussed importance of diet compliance in controlling DM; discussed consequences of uncontrolled DM; pt appeared receptive and motivated to learn. Pt independent for ADLs; does his own food shopping/cooking; reported good appetite/po intake PTA. Pt with h/o prediabetes; newly diagnosed with DM (HgbA1c=10.3%); Endocrinology following. Pt's mother has DM, so pt has been trying to make changes to his diet to avoid becoming diabetic, however lacking exposure to accurate diet information.  Provided pt with DM nutrition counseling/education materials; discussed CHO foods & beverages with their recommended portion sizes; discussed plate method; discussed importance of diet compliance, BG monitoring for controlling DM; discussed consequences of uncontrolled DM and its relationship to heart & kidney disease; pt appeared receptive and motivated to learn.  Pt continues with good appetite/po intake; no chewing/swallowing difficulty; no c/o n/v/d/c.  Pt reported  lbs.; wt fairly stable.

## 2022-06-02 NOTE — DIETITIAN INITIAL EVALUATION ADULT - REASON INDICATOR FOR ASSESSMENT
Pt with elevated HgbA1c=10.3% Nutrition Consult for assessment & education; pt with elevated HgbA1c=10.3%

## 2022-06-02 NOTE — PROGRESS NOTE ADULT - SUBJECTIVE AND OBJECTIVE BOX
Patient is a 71y old  Male who presents with a chief complaint of chest pain (02 Jun 2022 17:30)      Interval History: finger sticks are < 200 now   on Lantus 10 units and prandial lispro 3 units     MEDICATIONS  (STANDING):  amLODIPine   Tablet 2.5 milliGRAM(s) Oral daily  aspirin  chewable 81 milliGRAM(s) Oral daily  atorvastatin 40 milliGRAM(s) Oral at bedtime  dextrose 50% Injectable 25 Gram(s) IV Push once  dextrose 50% Injectable 12.5 Gram(s) IV Push once  dextrose 50% Injectable 25 Gram(s) IV Push once  dextrose Oral Gel 15 Gram(s) Oral once  glucagon  Injectable 1 milliGRAM(s) IntraMuscular once  influenza  Vaccine (HIGH DOSE) 0.7 milliLiter(s) IntraMuscular once  insulin glargine Injectable (LANTUS) 10 Unit(s) SubCutaneous at bedtime  insulin lispro Injectable (ADMELOG) 3 Unit(s) SubCutaneous three times a day before meals    MEDICATIONS  (PRN):  acetaminophen     Tablet .. 650 milliGRAM(s) Oral every 6 hours PRN Mild Pain (1 - 3), Moderate Pain (4 - 6)  melatonin 3 milliGRAM(s) Oral at bedtime PRN Insomnia  nitroglycerin     SubLingual 0.4 milliGRAM(s) SubLingual every 5 minutes PRN Chest Pain      Allergies    Drug Allergies Not Recorded  Pineapple (Stomach Upset)    Intolerances        REVIEW OF SYSTEMS:  CONSTITUTIONAL: no changes  EYES: No eye pain, visual disturbances, or discharge  ENMT:  No difficulty hearing, No sinus or throat pain  NECK: No pain or stiffness  RESPIRATORY: No cough, wheezing, chills or hemoptysis; No shortness of breath  CARDIOVASCULAR: No chest pain, palpitations or leg swelling  GASTROINTESTINAL: No abdominal or epigastric pain. No nausea, vomiting, or hematemesis; No diarrhea or constipation. No melena or hematochezia.  GENITOURINARY: No dysuria, frequency, hematuria, or incontinence  NEUROLOGICAL: No headaches, memory loss, loss of strength, numbness, or tremors  SKIN: No itching, burning, rashes, or lesions   ENDOCRINE: No heat or cold intolerance; No hair loss  MUSCULOSKELETAL: No joint pain or swelling; No muscle, back, or extremity pain  PSYCHIATRIC: No depression, anxiety, mood swings, or difficulty sleeping  HEME/LYMPH: No easy bruising, or bleeding gums  ALLERY AND IMMUNOLOGIC: No hives or eczema    Vital Signs Last 24 Hrs  T(C): 36.6 (02 Jun 2022 16:32), Max: 36.9 (01 Jun 2022 23:19)  T(F): 97.9 (02 Jun 2022 16:32), Max: 98.4 (01 Jun 2022 23:19)  HR: 65 (02 Jun 2022 16:32) (50 - 65)  BP: 138/77 (02 Jun 2022 16:32) (127/78 - 138/77)  BP(mean): --  RR: 18 (02 Jun 2022 16:32) (16 - 18)  SpO2: 95% (02 Jun 2022 16:32) (95% - 96%)    PHYSICAL EXAM:  GENERAL:   HEAD: Atraumatic, Normocephalic  EYES: PERRLA, conjunctiva and sclera clear  ENMT: No  exudates,; Moist mucous membranes,, No lesions  NECK: Supple, No JVD, Normal thyroid  NERVOUS SYSTEM:  Alert & Oriented,   CHEST/LUNG: Clear to auscultation bilaterally; No rales, rhonchi, wheezing, or rubs  HEART: Regular rate and rhythm; No murmurs, rubs, or gallops  ABDOMEN: Soft, Nontender, Nondistended; Bowel sounds present  EXTREMITIES:  2+ Peripheral Pulses, no edema  SKIN: No rashes or lesions    LABS:        CAPILLARY BLOOD GLUCOSE      POCT Blood Glucose.: 162 mg/dL (02 Jun 2022 17:06)  POCT Blood Glucose.: 260 mg/dL (02 Jun 2022 11:42)  POCT Blood Glucose.: 228 mg/dL (02 Jun 2022 08:06)  POCT Blood Glucose.: 268 mg/dL (01 Jun 2022 21:24)    Lipid panel:   CARDIAC MARKERS ( 01 Jun 2022 06:53 )  x     / x     / 33 U/L / x     / <1.0 ng/mL          Thyroid:  Diabetes Tests:  Parathyroid Panel:  Adrenals:  RADIOLOGY & ADDITIONAL TESTS:    Imaging Personally Reviewed:  [ ] YES  [ ] NO    Consultant(s) Notes Reviewed:  [ ] YES  [ ] NO    Care Discussed with Consultants/Other Providers [ ] YES  [ ] NO

## 2022-06-02 NOTE — DISCHARGE NOTE PROVIDER - NSDCCPCAREPLAN_GEN_ALL_CORE_FT
PRINCIPAL DISCHARGE DIAGNOSIS  Diagnosis: Atypical chest pain  [facility]      SECONDARY DISCHARGE DIAGNOSES  Diagnosis: Hyperlipidemia, unspecified  Assessment and Plan of Treatment:     Diagnosis: Benign essential HTN  Assessment and Plan of Treatment:     Diagnosis: Type 2 diabetes mellitus not at goal  Assessment and Plan of Treatment:

## 2022-06-02 NOTE — DISCHARGE NOTE PROVIDER - HOSPITAL COURSE
HPI:  71 years old male with h/o HTN, HLD, prediabetes present to ED with complain of chest pain. Patient reported chest pain started at midnight, located on left chest, heavy pain, 9/10 intensity, lasted for around 8-9 hours until arrival in ED. Chest pain is improved with nitroglycerine patient received in ED. No diaphoresis, SOB or LOC. Normally very active with no exertional chest pain   Hypertensive upon arrival, improved with nitroglycerine, sat well at RA. EKG with sinus devonte, VR 53, QTc 377, PAC+. No leukocytosis, plt 170, K 4.5, Cr 0.87, glucose 263, hsTnT 4.1, CKMD <1. CTA with no aortic aneurysm or dissection.     SH: former smoker ( stopped 10 years ago)  FH: Mother-DM (31 May 2022 15:23)  < from: NM Stress Test, Dual Isotope (06.01.22 @ 10:56) >    IMPRESSION: Normal myocardial perfusion scan.    1. No scintigraphic evidence for myocardial infarct or ischemia.    2. There is normal left ventricular contractility, normal calculated   ejection fraction and normal myocardial thickening at rest. Overall post   stress ejection fraction was >70%    3. Please see thecardiac test report for EKG findings and symptoms   during the procedure    --- End of Report ---    *** END OF ADDENDUM***      PROCEDURE DATE:  06/01/2022          INTERPRETATION:  Name of test: Lexiscan nuclear stress test    Indication: Chest discomfort    Medical History: Hypertension hyperlipidemia    Medication: Norvasc aspirin Lipitor    Interpretation: The patient exercised according to Lexiscan protocol. The   resting heart rate was of 53 bpm. It daly to a maximum heart rate of 85   bpm. This value represent 57% of the maximal age predicted heart rate.   Patient tolerated the procedure well. No complications occurred. No   arrhythmias seen. No chest pain occurred. No ST-T changes seen. Normal   blood pressure response. The exercise test was stopped due to completion   of protocol. Negative EKG changes for stress-induced myocardial ischemia.   Waiting for nuclear imaging report    Conclusion/summary:  Impression: Negative EKG changes for stress-induced myocardial ischemia..   Waiting for nuclear imaging report    --- End of Report ---    ***Please see the addendum at the top of this report. It may contain   additional important information or changes.****    < end of copied text >  < from: TTE Echo Complete w/o Contrast w/ Doppler (06.02.22 @ 10:21) >    Summary:   1. Left ventricular ejection fraction, by visual estimation, is 60 to   65%.   2. Normal global left ventricular systolic function.   3. E/A reversal consistent with reduced LV compliance.   4. Normal right ventricular size and function.   5. The left atrium is normal in size.   6. Degenerative mitral valve.   7. Trace mitral valve regurgitation.   8.Normal trileaflet aortic valve with normal opening.      patient to be discharged [facility]

## 2022-06-02 NOTE — DIETITIAN INITIAL EVALUATION ADULT - PERTINENT LABORATORY DATA
06-01    139  |  105  |  11  ----------------------------<  277<H>  4.2   |  25  |  1.00    Ca    8.2<L>      01 Jun 2022 06:53  Phos  2.6     06-01  Mg     2.4     06-01    TPro  6.4  /  Alb  3.1<L>  /  TBili  0.5  /  DBili  x   /  AST  10<L>  /  ALT  11<L>  /  AlkPhos  80  06-01  POCT Blood Glucose.: 260 mg/dL (06-02-22 @ 11:42)  A1C with Estimated Average Glucose Result: 10.3 % (06-01-22 @ 08:49)

## 2022-06-02 NOTE — DIETITIAN INITIAL EVALUATION ADULT - PERTINENT MEDS FT
MEDICATIONS  (STANDING):  amLODIPine   Tablet 2.5 milliGRAM(s) Oral daily  aspirin  chewable 81 milliGRAM(s) Oral daily  atorvastatin 40 milliGRAM(s) Oral at bedtime  dextrose 50% Injectable 25 Gram(s) IV Push once  dextrose 50% Injectable 12.5 Gram(s) IV Push once  dextrose 50% Injectable 25 Gram(s) IV Push once  dextrose Oral Gel 15 Gram(s) Oral once  glucagon  Injectable 1 milliGRAM(s) IntraMuscular once  influenza  Vaccine (HIGH DOSE) 0.7 milliLiter(s) IntraMuscular once  insulin glargine Injectable (LANTUS) 10 Unit(s) SubCutaneous at bedtime  insulin lispro Injectable (ADMELOG) 3 Unit(s) SubCutaneous three times a day before meals    MEDICATIONS  (PRN):  acetaminophen     Tablet .. 650 milliGRAM(s) Oral every 6 hours PRN Mild Pain (1 - 3), Moderate Pain (4 - 6)  melatonin 3 milliGRAM(s) Oral at bedtime PRN Insomnia  nitroglycerin     SubLingual 0.4 milliGRAM(s) SubLingual every 5 minutes PRN Chest Pain

## 2022-06-02 NOTE — DISCHARGE NOTE NURSING/CASE MANAGEMENT/SOCIAL WORK - PATIENT PORTAL LINK FT
You can access the FollowMyHealth Patient Portal offered by [facility] by registering at the following website: [email or web address]. By joining [facility]’s FollowMyHealth portal, you will also be able to view your health information using other applications (apps) compatible with our system.

## 2022-06-07 DIAGNOSIS — E78.5 HYPERLIPIDEMIA, UNSPECIFIED: ICD-10-CM

## 2022-06-07 DIAGNOSIS — R07.89 OTHER CHEST PAIN: ICD-10-CM

## 2022-06-07 DIAGNOSIS — E11.65 TYPE 2 DIABETES MELLITUS WITH HYPERGLYCEMIA: ICD-10-CM

## 2022-06-07 DIAGNOSIS — R07.9 CHEST PAIN, UNSPECIFIED: ICD-10-CM

## 2022-06-07 DIAGNOSIS — I10 ESSENTIAL (PRIMARY) HYPERTENSION: ICD-10-CM

## 2023-03-15 PROBLEM — R73.03 PREDIABETES: Chronic | Status: ACTIVE | Noted: 2022-05-31

## 2023-03-28 ENCOUNTER — APPOINTMENT (OUTPATIENT)
Dept: UROLOGY | Facility: CLINIC | Age: 73
End: 2023-03-28

## 2023-04-11 ENCOUNTER — APPOINTMENT (OUTPATIENT)
Dept: UROLOGY | Facility: CLINIC | Age: 73
End: 2023-04-11

## 2024-03-04 NOTE — ED PROVIDER NOTE - ENMT, MLM
Airway patent, Nasal mucosa clear. Mouth with normal mucosa. Throat has no vesicles, no oropharyngeal exudates and uvula is midline. no hearing difficulty/no ear pain/no nasal congestion/no nasal discharge/no nasal obstruction/no post-nasal discharge

## 2024-05-10 NOTE — PRE-OP CHECKLIST - TYPE OF SOLUTION
OFFICE VISIT      Patient: Celestino Altman Date of Service: 2024    : 1951 MRN: [id number]     SUBJECTIVE:     Chief Complaint   Patient presents with    Medicare Wellness Visit       The recording was initiated after a verbal consent was obtained from the patient to record this visit for documentation in their clinical record.    Patient Care Team:  See Story MD as PCP - General (Family Practice)  Mj Ibrahim MD (Urology)    HISTORY OF PRESENT ILLNESS:  Celestino Altman is a 72 year old male who presents today for a annual subsequent wellness exam.     72-year-old male who presents today for an annual subsequent wellness exam.    Hyperlipidemia LDL goal <70; High coronary artery calcium score: Currently, on Rosuvastatin 10 mg. Denies any chest pains. Lipid panel looks excellent.  CPK levels have been normal. Cholesterol was at 134 mg/dL.    HTN (hypertension), benign: Blood pressure was controlled when monitored today in the clinic. Currently, on Valsartan 80 mg once daily. No chest pain, or heart problems.    BPH associated with nocturia: Is on Flomax 0.8 mg, and Proscar 5 mg. Is able to urinate normally. PSA was normal.    Medicare annual wellness visit, subsequent:  Is on vitamin supplements,   Is on magnesium supplements as needed for cramps. Does get cramp in calves. No cramps for now. Also take zinc supplements.   Diet: Eating one apple per day. He loves to eat apples. Eat watermelon, grapes, plums, and pitches as per season.  Does feel a bit tired at times. Does not eat much citrus fruit.  Depression screening: No significant depression, or sadness noted.   X-rays were normal.  Labs: Labs was done on 2024 showed Kidney functions were normal. WBC, and RBC count were normal. Blood glucose levels were borderline elevated in the past.    Elevated bilirubin; Intermittent abdominal bloating:  He also does complain of some intermittent abdominal bloating, gas, and rumbling in his stomach. Denies  any watery diarrhea, no hematemesis, no severe heartburn.  Used to take Probiotic supplements without many benefits. Usually it does not bother him all day, but after supper, and laying down in bed it bothers him. Has churning, and abdominal bloating. No abdominal discomfort noted before starting  Meloxicam. Sometimes he feels like a diarrhea, but no such major issues.  Denies watery diarrhea.Has elevated bilirubin levels.    Pain in toe of left foot: Complains of pain in left toe since last visit. Had some tests which were normal.  The pain is mostly along the Skin surface along the medial edge of the toe when he puts his sock on. It does not hurt when he walks normally. Did have BRYON testing done previously that showed good circulation in his foot. Uric acid levels have been normal. No signs of gout. No numbness or paresthesias. He did have x-rays done to the foot in 2022. Used to take Meloxicam 15 mg. In the morning, noticed shock-like feeling going downward from calves to ankle. 5-7 years ago he followed with physician who prescribed orthotics.     Additional comments:  Lower back pain improved with physical therapy, and do various exercises for this.   Chronic tinnitus: Does have chronic tinnitus and some difficult hearing, has seen an audiologist for this in the past. They did not feel hearing aids would be that beneficial for him.  He has had hearing testing done which showed 90% hearing in one ear and 85% in the other. Has issues with loud noise.    He filled out a Medicare health risk assessment form and I went over this with him today.  It is filed in the EMR.  We discussed the pertinent points that he had marked.  We did discuss treatment options and associated risks and benefits for his medical problems.The following items were also identified and addressed:    No risks were identified   He has no evidence of cognitive dysfunction by direct observation  Advance directives on file: Yes    He does have a  living will and power of  set up already.  He is not at risk for aneurysms.   His immunization status was reviewed and listed below.   He has no risk factors for hepatitis B.    Before he left, a preventative screening education printout along with a completed list of services and screening recommendations was given to the patient.    Recent PHQ 2/9 Score    PHQ 2:  PHQ 2 Score Adult PHQ 2 Score Adult PHQ 2 Interpretation Little interest or pleasure in activity?   5/7/2024  10:01 AM 0 No further screening needed 0       PHQ 9:       Opioid review: is not taking opioid medications.  Opioid Risk Tool Total Score:    Score 0-3 = Low Risk, 4-7 = Moderate Risk, 8+ = High Risk     PAST MEDICAL HISTORY:  Patient Active Problem List   Diagnosis    Hayfever    BPH associated with nocturia    Hyperlipidemia LDL goal <70    HTN (hypertension), benign    Blindness of left eye due to strabismus    Elevated PSA    High coronary artery calcium score    Diverticulosis of colon    Spinal stenosis of lumbar region with neurogenic claudication    Low back pain radiating to lower extremity    Tinnitus of both ears       Immunization History   Administered Date(s) Administered    COVID Pfizer 12Y+ (Requires Dilution) 01/29/2021, 02/19/2021, 10/05/2021    COVID Pfizer Bivalent 12Y+ 11/28/2022    COVID Pfizer/Comirnaty 12+ ([id number]) 10/10/2023    Hep B, adult 12/09/2016, 01/17/2017    Hepatitis A - Adult 12/09/2016    Influenza, High Dose quadrivalent, preserve-free 10/05/2021, 10/13/2022    Influenza, Unspecified Formulation 10/30/2017    Influenza, high dose seasonal, preservative-free 10/08/2015, 10/11/2018, 10/01/2020    Influenza, quadrivalent, adjuvanted 10/17/2023    Influenza, seasonal, injectable, preservative free 10/31/2013, 10/21/2014    Influenza, seasonal, injectable, trivalent 10/02/2009, 10/31/2011, 10/19/2016, 10/01/2019, 10/15/2020    Pneumococcal Conjugate 7 Valent 12/01/2016    Pneumococcal Polysaccharide  PPV23 12/12/2017    Pneumococcal conjugate PCV 13 12/01/2016    Tdap 02/25/2009, 07/14/2020    Zostavax (Zoster Shingles) 06/24/2015    Zoster recombinant 04/30/2019, 08/13/2019, 08/14/2019    influenza, trivalent, adjuvanted 10/19/2016, 10/30/2017, 11/01/2019       MEDICATIONS:  Patient's Medications   New Prescriptions    No medications on file   Previous Medications    B COMPLEX VITAMINS (VITAMIN B COMPLEX PO)    Take by mouth daily.    CHOLECALCIFEROL (VITAMIN D3) 1000 UNITS TABLET    Take 2,000 Units by mouth daily.    FINASTERIDE (PROSCAR) 5 MG TABLET    Take 1 tablet by mouth daily.    MAGNESIUM ASCORBATE POWDER    One teaspoon daily.    MELOXICAM (MOBIC) 15 MG TABLET    Take 1 tablet by mouth daily as needed for Pain.    MULTIPLE VITAMINS-MINERALS (MULTIVITAMIN PO)    Take 1 tablet by mouth daily.     PROBIOTIC PRODUCT (PROBIOTIC DAILY PO)    Take by mouth daily.    ROSUVASTATIN (CRESTOR) 10 MG TABLET    Take 1 tablet by mouth daily.    TAMSULOSIN (FLOMAX) 0.4 MG CAP    Take 1 capsule by mouth daily after a meal. ONE HALF HOUR AFTER EVENING MEAL    VALSARTAN (DIOVAN) 80 MG TABLET    Take 1 tablet by mouth daily.    ZINC 50 MG TAB    Take 50 mg by mouth daily.   Modified Medications    No medications on file   Discontinued Medications    No medications on file       ALLERGIES:  ALLERGIES:   Allergen Reactions    Penicillins ANAPHYLAXIS     Fever- was hospitalized as child        Seasonal Other (See Comments)     itchy watery eyes, sneeze       PAST SURGICAL HISTORY:  Past Surgical History:   Procedure Laterality Date    Biopsy of prostate,needle/punch  06/09/2020    Colonoscopy  07/14/2015    due 2020    Colonoscopy  03/16/2021    due 2031    Strabismus surgery      as child    [place] tooth extraction         FAMILY HISTORY:  Family History   Problem Relation Age of Onset    Cancer, Ovarian Mother     Diabetes Mother     Heart disease Father     Glaucoma Father     Multiple Sclerosis Sister     Multiple  Sclerosis Maternal Grandmother     Cancer, Prostate Brother 63       SOCIAL HISTORY:  Social History     Tobacco Use    Smoking status: Never    Smokeless tobacco: Never   Vaping Use    Vaping status: never used   Substance Use Topics    Alcohol use: Yes     Alcohol/week: 2.0 standard drinks of alcohol     Types: 2 Standard drinks or equivalent per week    Drug use: No       See Health Risk Assessment questionnaire for additional information on diet/nutrition/physical activity/exercise etc.    Review of Systems    Constitutional: As per HPI.  HENT: As per HPI.   Respiratory: As per HPI.   Cardiovascular: As per HPI.   Gastrointestinal: As per HPI.  Genitourinary: As per HPI  Skin: As per HPI.   Neurological: As per HPI.  Psychiatric/Behavioral: As per HPI.  Musculoskeletal: As per HPI.  All other systems were reviewed and negative.    OBJECTIVE:     Visit Vitals  /76 (BP Location: LUE - Left upper extremity, Patient Position: Sitting, Cuff Size: Large Adult)   Pulse (!) 56   Temp 97.1 °F (36.2 °C)   Ht 5' 9.5\"   Wt 96.6 kg (212 lb 14.4 oz)   BMI 30.99 kg/m²         Physical Exam     He was able to ambulate without any assistive devices.   General: Calm, in no acute respiratory distress.   HEENT: Sclera and lids appear normal. TM's are normal.  Oropharynx is normal.   Neck: Supple, without adenopathy, JVD, or bruits.  No masses. Thyroid is not enlarged.   Heart: Regular rate and rhythm with no murmurs or rubs.   Lungs: Clear to auscultation.  Psych: Mood appears good. No evidence of cognitive dysfunction or direct observation.  Skin: No suspicious looking skin lesions were seen. Skin is non-jaundiced.   Abdomen: Soft, nontender, nondistended, with no obvious hepatosplenomegaly.   Extremities: No edema.   Musculoskeletal: Toe appears normal today except for some tenderness to palpation along the medial edge of the left great toe, but there's no redness or deformity noted.    ASSESSMENT AND PLAN:   This is a 72  year old year-old male who presents with     1. Hyperlipidemia LDL goal <70  Under excellent control.  Reviewed lab reports.  Continue with the Crestor for now.     2. HTN (hypertension), benign  Under good control.  Continue current medications.    3. BPH associated with nocturia  Stable on the Flomax 0.8 mg, and Proscar 5 mg.   Continue current medications.    4. High coronary artery calcium score  Refer assessments 1.    5. Medicare annual wellness visit, subsequent  Reviewed, and discussed prior lab reports.  Explained side effects of magnesium supplements like diarrhea.  Reviewed current medication list.     6. Elevated bilirubin  Has abdominal bloating and discomfort intermittently.  Reviewed lab reports.  Recommend checking abdominal ultrasound to further evaluate for gallstones, etc because of elevated bilirubin levels.  Ordered US liver gallbladder. Refer to orders.   Will wait for ultrasound results.   Regular liver enzymes are normal except the bilirubin level.   Keep working on diet.  Told that Meloxicam may irritate the stomach.  Consider monitoring medication intake which causes irritation.    - US LIVER GALLBLADDER PANCREAS (RUQ); Future    7. Pain in toe of left foot  Offered to have him consult with podiatry or other specialists, but he declines at this time.   Encouraged him to continue wearing good fitting shoes, orthotics, etc.     Additional plan:  Intermittent abdominal bloating:   Refer assessments 6.  Chronic tinnitus:  Followed by an audiologist.  As per audiologist; they did not feel hearing aids would be that beneficial for him.             Patient Instructions   Medicare Wellness Visit  Personalized Plan for Preventive Care  5/9/2024    An important way to stay healthy is to use preventive services provided by doctors and health care providers.  Preventive services can find health problems early when treatment works best and can keep you from getting certain diseases or illnesses.   Medicare pays for many preventive services to help keep you healthy. To complete your personal preventive care plan, you are in need of the following Health Maintenance screening services. The next due date is listed after the specific service.      Health Maintenance Summary       Hepatitis B Vaccine (For Physician/APC Discussion) (3 of 3 - 19+ 3-dose series)  Overdue since 6/9/2017    Traditional Medicare- Medicare Wellness Visit (Yearly)  Overdue since 3/1/2024    Depression Screening (Yearly)  Next due on 5/7/2025    Colorectal Cancer Risk - Colonoscopy (Every 5 Years)  Next due on 3/16/2026    DTaP/Tdap/Td Vaccine (3 - Td or Tdap)  Next due on 7/14/2030    Hepatitis C Screening   Completed    Pneumococcal Vaccine 65+   Completed    Shingles Vaccine   Completed    COVID-19 Vaccine   Completed    Influenza Vaccine   Completed    Meningococcal Vaccine   Aged Out    HPV Vaccine   Aged Out          No orders of the defined types were placed in this encounter.      Preventive Care for Women and Men    Heart Screenings (Cardiovascular):  Blood tests are used to check your cholesterol, lipid and triglyceride levels. High levels can increase your risk for heart disease and stroke. High levels can be treated with medications, diet and exercise. Lowering your levels can help keep your heart and blood vessels healthy.  Your provider will order these tests if they are needed.    An ultrasound is done to see if you have an abdominal aortic aneurysm (AAA).  This is an enlargement of one of the main blood vessels that delivers blood to the body.   In the [place], 9,000 deaths are caused by AAA.  You may not even know you have this problem and as many as 1 in 3 people will have a serious problem if it is not treated.  Early diagnosis allows for more effective treatment and cure.  If you have a family history of AAA or are a male age 65-75 who has smoked, you are at higher risk of an AAA.  Your provider can order this  test, if needed.    Colorectal Screening:  There are many tests that are used to check for cancer of your colon and rectum. You and your provider should discuss what test is best for you and when to have it done.  Options include:  Screening Colonoscopy: exam of the entire colon, seen through a flexible lighted tube.  Cologuard DNA stool test: a sample of your stool is used to screen for cancer and unseen blood in your stool.  Fecal Occult Blood Test: a sample of your stool is studied to find any unseen blood    Flu Shot:  An immunization that helps to prevent influenza (the flu). You should get this every year. The best time to get the shot is in the fall.    Pneumococcal Shot:  Vaccines are available that can help prevent pneumococcal disease, which is any type of infection caused by Streptococcus pneumoniae bacteria.   Their use can prevent some cases of pneumonia, meningitis, and sepsis. There are two types of pneumococcal vaccines:   Conjugate vaccines (PCV-13 or Prevnar 13®) - helps protect against the 13 types of pneumococcal bacteria that are the most common causes of serious infections in children and adults.    Polysaccharide vaccine (PPSV23 or Pxpppqtfd40®) - helps protect against 23 types of pneumococcal bacteria for patients who are recommended to get it.  These vaccines should be given at least 12 months apart.  A booster is usually not needed.     Hepatitis B Shot:  An immunization that helps to protect people from getting Hepatitis B. Hepatitis B is a virus that spreads through contact with infected blood or body fluids. Many people with the virus do not have symptoms.  The virus can lead to serious problems, such as liver disease. Some people are at higher risk than others. Your doctor will tell you if you need this shot.     Diabetes Screening:  A test to measure sugar (glucose) in your blood is called a fasting blood sugar. Fasting means you cannot have food or drink for at least 8 hours before  the test. This test can detect diabetes long before you may notice symptoms.    Glaucoma Screening:  Glaucoma screening is performed by your eye doctor. The test measures the fluid pressure inside your eyes to determine if you have glaucoma.     Hepatitis C Screening:  A blood test to see if you have the hepatitis C virus.  Hepatitis C attacks the liver and is a major cause of chronic liver disease.  Medicare will cover a single screening for all adults born between 1945 & 1965, or high risk patients (people who have injected illegal drugs or people who have had blood transfusions).  High risk patients who continue to inject illegal drugs can be screened for Hepatitis C every year.    Smoking and Tobacco-Use Cessation Counseling:  Tobacco is the single greatest cause of disease and early death in our country today. Medication and counseling together can increase a person’s chance of quitting for good.   Medicare covers two quitting attempts per year, with four counseling sessions per attempt (eight sessions in a 12 month period)    Preventive Screening tests for Women    Screening Mammograms and Breast Exams:  An x-ray of your breasts to check for breast cancer before you or your doctor may be able to feel it.  If breast cancer is found early it can usually be treated with success.    Pelvic Exams and Pap Tests:  An exam to check for cervical and vaginal cancer. A Pap test is a lab test in which cells are taken from your cervix and sent to the lab to look for signs of cervical cancer. If cancer of the cervix is found early, chances for a cure are good. Testing can generally end at age 65, or if a woman has a hysterectomy for a benign condition. Your provider may recommend more frequent testing if certain abnormal results are found.    Bone Mass Measurements:  A painless x-ray of your bone density to see if you are at risk for a broken bone. Bone density refers to the thickness of bones or how tightly the bone tissue  is packed.    Preventive Screening tests for Men    Prostate Screening:  Should you have a prostate cancer test (PSA)?  It is up to you to decide if you want a prostate cancer test. Talk to your clinician to find out if the test is right for you.  Things for you to consider and talk about should include:  Benefits and harms of the test  Your family history  How your race/ethnicity may influence the test  If the test may impact other medical conditions you have  Your values on screenings and treatments    *Medicare pays for many preventive services to keep you healthy. For some of these services, you might have to pay a deductible, coinsurance, and / or copayment.  The amounts vary depending on the type of services you need and the kind of Medicare health plan you have.    For further details on screenings offered by Medicare please visit: https://www.medicare.gov/coverage/preventive-screening-services     Lab Services on 04/19/2024   Component Date Value Ref Range Status    Prostate Specific Antigen 04/19/2024 2.03  <=6.50 ng/mL Final    [employer] Chemiluminescence. Results obtained with different assay methods or kits cannot be used interchangeably.      Fasting Status 04/19/2024 14  0 - 999 Hours Final    Sodium 04/19/2024 142  135 - 145 mmol/L Final    Potassium 04/19/2024 4.6  3.4 - 5.1 mmol/L Final    Chloride 04/19/2024 110  97 - 110 mmol/L Final    Carbon Dioxide 04/19/2024 29  21 - 32 mmol/L Final    Anion Gap 04/19/2024 8  7 - 19 mmol/L Final    Glucose 04/19/2024 102 (H)  70 - 99 mg/dL Final    BUN 04/19/2024 24 (H)  6 - 20 mg/dL Final    Creatinine 04/19/2024 0.98  0.67 - 1.17 mg/dL Final    Glomerular Filtration Rate 04/19/2024 81  >=60 Final    eGFR results = or >60 mL/min/1.73m2 = Normal kidney function. Estimated GFR calculated using the CKD-EPI-R (2021) equation that does not include race in the creatinine calculation.    BUN/Cr 04/19/2024 24  7 - 25 Final    Calcium 04/19/2024 9.4  8.4 - 10.2  mg/dL Final    Bilirubin, Total 04/19/2024 1.4 (H)  0.2 - 1.0 mg/dL Final    GOT/AST 04/19/2024 14  <=37 Units/L Final    GPT/ALT 04/19/2024 22  <64 Units/L Final    Alkaline Phosphatase 04/19/2024 76  45 - 117 Units/L Final    Albumin 04/19/2024 4.0  3.6 - 5.1 g/dL Final    Protein, Total 04/19/2024 6.8  6.4 - 8.2 g/dL Final    Globulin 04/19/2024 2.8  2.0 - 4.0 g/dL Final    A/G Ratio 04/19/2024 1.4  1.0 - 2.4 Final    Cholesterol 04/19/2024 134  <=199 mg/dL Final      Desirable         <200  Borderline High   200 to 239  High              >=240    Triglycerides 04/19/2024 137  <=149 mg/dL Final      Normal            <150  Borderline High   150 to 199  High              200 to 499  Very High         >=500    HDL 04/19/2024 46  >=40 mg/dL Final      Low              <40  Borderline Low   40 to 49  Near Optimal     50 to 59  Optimal          >=60    LDL 04/19/2024 61  <=129 mg/dL Final      Optimal           <100  Near Optimal      100 to 129  Borderline High   130 to 159  High              160 to 189  Very High         >=190    Non-HDL Cholesterol 04/19/2024 88  mg/dL Final      Therapeutic Target:  CHD and risk equivalents  <130  Multiple risk factors     <160  0 to 1 risk factor        <190    Cholesterol/ HDL Ratio 04/19/2024 2.9  <=4.4 Final    Uric Acid 04/19/2024 5.6  3.5 - 7.2 mg/dL Final    CK 04/19/2024 47  39 - 308 Units/L Final    WBC 04/19/2024 5.3  4.2 - 11.0 K/mcL Final    RBC 04/19/2024 4.84  4.50 - 5.90 mil/mcL Final    HGB 04/19/2024 15.6  13.0 - 17.0 g/dL Final    HCT 04/19/2024 44.6  39.0 - 51.0 % Final    MCV 04/19/2024 92.1  78.0 - 100.0 fl Final    MCH 04/19/2024 32.2  26.0 - 34.0 pg Final    MCHC 04/19/2024 35.0  32.0 - 36.5 g/dL Final    RDW-CV 04/19/2024 11.9  11.0 - 15.0 % Final    RDW-SD 04/19/2024 40.6  39.0 - 50.0 fL Final    PLT 04/19/2024 217  140 - 450 K/mcL Final    NRBC 04/19/2024 0  <=0 /100 WBC Final    Neutrophil, Percent 04/19/2024 65  % Final    Lymphocytes, Percent  04/19/2024 26  % Final    Mono, Percent 04/19/2024 6  % Final    Eosinophils, Percent 04/19/2024 2  % Final    Basophils, Percent 04/19/2024 1  % Final    Immature Granulocytes 04/19/2024 0  % Final    Absolute Neutrophils 04/19/2024 3.4  1.8 - 7.7 K/mcL Final    Absolute Lymphocytes 04/19/2024 1.4  1.0 - 4.0 K/mcL Final    Absolute Monocytes 04/19/2024 0.3  0.3 - 0.9 K/mcL Final    Absolute Eosinophils  04/19/2024 0.1  0.0 - 0.5 K/mcL Final    Absolute Basophils 04/19/2024 0.0  0.0 - 0.3 K/mcL Final    Absolute Immature Granulocytes 04/19/2024 0.0  0.0 - 0.2 K/mcL Final         Instructions provided as documented in the AVS.  Medication use,effects and side effects discussed in detail with patient.  The patient indicated understanding of the diagnosis and agreed with the plan of care.  Medical compliance with plan discussed and risks of non-compliance reviewed.    Patient education completed on disease process, etiology & prognosis.    Patient expresses understanding of the plan.    Proper usage and side effects of medications reviewed & discussed.    Refer to orders.    Return to clinic as clinically indicated as discussed with patient who verbalized understanding of & agreement with the plan.    Return in about 1 year (around 5/9/2025), or if symptoms worsen or fail to improve, for Wellness Exam, with labs 2-3 days before visit.    IPj, have created a visit summary document based on the audio recording between Dr. See Story MD and this patient for the physician to review, edit as needed, and authenticate.  Creation Date: 5/10/2024 Time: 1:45 AM      I have reviewed and edited the visit summary above and attest that it is accurate.   -Dominic Story MD       L/R @ 125 ml/hr

## 2024-09-30 ENCOUNTER — RESULT REVIEW (OUTPATIENT)
Age: 74
End: 2024-09-30

## 2024-10-01 ENCOUNTER — RESULT REVIEW (OUTPATIENT)
Age: 74
End: 2024-10-01

## 2024-10-01 ENCOUNTER — TRANSCRIPTION ENCOUNTER (OUTPATIENT)
Age: 74
End: 2024-10-01

## 2024-10-02 ENCOUNTER — TRANSCRIPTION ENCOUNTER (OUTPATIENT)
Age: 74
End: 2024-10-02

## 2024-10-21 ENCOUNTER — APPOINTMENT (OUTPATIENT)
Dept: BARIATRICS | Facility: CLINIC | Age: 74
End: 2024-10-21

## 2024-10-21 VITALS
SYSTOLIC BLOOD PRESSURE: 123 MMHG | HEIGHT: 70 IN | HEART RATE: 70 BPM | WEIGHT: 170 LBS | TEMPERATURE: 98 F | OXYGEN SATURATION: 97 % | BODY MASS INDEX: 24.34 KG/M2 | DIASTOLIC BLOOD PRESSURE: 68 MMHG

## 2024-10-21 PROCEDURE — 99024 POSTOP FOLLOW-UP VISIT: CPT

## 2025-03-19 NOTE — ED ADULT NURSE NOTE - IS THE PATIENT ABLE TO BE SCREENED?
STI Culture reviewed which shows chlamydia.  STI: Patient empirically treated in ED. No further treatment needed. Patient called and FIRST HIPAA compliant VM left. 
Yes